# Patient Record
Sex: MALE | Race: WHITE | HISPANIC OR LATINO | Employment: FULL TIME | URBAN - METROPOLITAN AREA
[De-identification: names, ages, dates, MRNs, and addresses within clinical notes are randomized per-mention and may not be internally consistent; named-entity substitution may affect disease eponyms.]

---

## 2024-04-11 ENCOUNTER — OFFICE VISIT (OUTPATIENT)
Dept: FAMILY MEDICINE CLINIC | Facility: CLINIC | Age: 39
End: 2024-04-11

## 2024-04-11 VITALS
SYSTOLIC BLOOD PRESSURE: 128 MMHG | RESPIRATION RATE: 16 BRPM | TEMPERATURE: 98.4 F | BODY MASS INDEX: 28.16 KG/M2 | HEIGHT: 65 IN | HEART RATE: 96 BPM | DIASTOLIC BLOOD PRESSURE: 82 MMHG | WEIGHT: 169 LBS | OXYGEN SATURATION: 99 %

## 2024-04-11 DIAGNOSIS — E03.9 HYPOTHYROIDISM, UNSPECIFIED TYPE: ICD-10-CM

## 2024-04-11 DIAGNOSIS — Z91.018 FOOD ALLERGY: Primary | ICD-10-CM

## 2024-04-11 DIAGNOSIS — Z13.220 ENCOUNTER FOR SCREENING FOR LIPID DISORDER: ICD-10-CM

## 2024-04-11 PROCEDURE — 99203 OFFICE O/P NEW LOW 30 MIN: CPT | Performed by: FAMILY MEDICINE

## 2024-04-11 RX ORDER — EPINEPHRINE 0.15 MG/.3ML
0.15 INJECTION INTRAMUSCULAR ONCE
COMMUNITY
End: 2024-04-11 | Stop reason: SDUPTHER

## 2024-04-11 RX ORDER — LEVOTHYROXINE SODIUM 0.1 MG/1
100 TABLET ORAL DAILY
Qty: 30 TABLET | Refills: 1 | Status: SHIPPED | OUTPATIENT
Start: 2024-04-11

## 2024-04-11 RX ORDER — EPINEPHRINE 0.15 MG/.3ML
0.15 INJECTION INTRAMUSCULAR ONCE
Qty: 0.3 ML | Refills: 0 | Status: SHIPPED | OUTPATIENT
Start: 2024-04-11 | End: 2024-04-11

## 2024-04-11 RX ORDER — LEVOTHYROXINE SODIUM 0.1 MG/1
100 TABLET ORAL DAILY
COMMUNITY
End: 2024-04-11 | Stop reason: SDUPTHER

## 2024-04-11 NOTE — PROGRESS NOTES
Depression Screening and Follow-up Plan: Patient was screened for depression during today's encounter. They screened negative with a PHQ-2 score of 0.    Tobacco Cessation Counseling: Tobacco cessation counseling was provided. The patient is sincerely urged to quit consumption of tobacco. He is ready to quit tobacco.       Assessment/Plan:     Chronic Problems:  No problem-specific Assessment & Plan notes found for this encounter.      Visit Diagnosis:  Diagnoses and all orders for this visit:    Food allergy  Comments:  EpiPen refilled, continued avoidance of known allergens  Orders:  -     EPINEPHrine (EPIPEN JR) 0.15 mg/0.3 mL SOAJ; Inject 0.3 mL (0.15 mg total) into a muscle once for 1 dose    Encounter for screening for lipid disorder  -     Lipid Panel with Direct LDL reflex; Future    Hypothyroidism, unspecified type  Comments:  Will obtain updated TSH along with other age-appropriate screening labs continue present dosing pending results  Orders:  -     Comprehensive metabolic panel; Future  -     CBC and differential; Future  -     TSH, 3rd generation; Future  -     UA w Reflex to Microscopic w Reflex to Culture; Future  -     levothyroxine 100 mcg tablet; Take 1 tablet (100 mcg total) by mouth daily    Other orders  -     Discontinue: levothyroxine 100 mcg tablet; Take 100 mcg by mouth daily  -     Discontinue: EPINEPHrine (EPIPEN JR) 0.15 mg/0.3 mL SOAJ; Inject 0.15 mg into a muscle once          Subjective:    Patient ID: Pedro Morse is a 38 y.o. male.    Negative establish   Last provider many yrs     Concerns:  Will need updated tsh , also reg age labs   No specific issues / no complaints   Doing quite well       Social history  Single   Neg children   Adrian jamison x 1 yr   - smoke   Etoh + social   Drug-     Family history  Father 81  a/w cad , htn chol   Mother 71  a/w   Sister a/w   Sister gluten       PMH:   Past Medical History:   . Food allergy   · Anxiety previously sertraline  ·  Depression   · Hypothyroidism medications Synthroid last TSH?    PSH:     · ANKLE ARTHROSCOPY Left 12/14/2017   ARTHROSCOPY ANKLE performed by Kirk Hassan MD at [facility] OR C1   · ANKLE SURGERY Left 2009   · BICEPS TENODESIS Right 12/15/2021   TENODESIS BICEPS OPEN PROXIMAL performed by Samuel Samuel MD at [facility] [address]   · COLONOSCOPY   · FOOT SURGERY Right 9/19/2019   EXCISION BONE FOOT performed by Kirk Hassan MD at [facility] OR C2   · HERNIA REPAIR Right   Inguinal hernia   · SEPTOPLASTY Bilateral 6/8/2017   SMR W/TURBINOPLASTY performed by Fracisco Fierro MD at [facility] OR [address]TH [address]LO[address]   · SHOULDER ARTHROSCOPY W/ ROTATOR CUFF REPAIR Right 12/15/2021   REPAIR SHOULDER ROTATOR CUFF ARTHROSCOPIC performed by Samuel Samuel MD at [facility] [address]           The following portions of the patient's history were reviewed and updated as appropriate: allergies, current medications, past family history, past medical history, past social history, past surgical history and problem list.    Review of Systems   Constitutional:  Negative for appetite change, chills, fever and unexpected weight change.   HENT:  Negative for congestion, dental problem, ear pain, hearing loss, postnasal drip, rhinorrhea, sinus pressure, sinus pain, sneezing, sore throat, tinnitus and voice change.    Eyes:  Negative for visual disturbance.   Respiratory:  Negative for apnea, cough, chest tightness and shortness of breath.    Cardiovascular:  Negative for chest pain, palpitations and leg swelling.   Gastrointestinal:  Negative for abdominal pain, blood in stool, constipation, diarrhea, nausea and vomiting.   Endocrine: Negative for cold intolerance, heat intolerance, polydipsia, polyphagia and polyuria.   Genitourinary:  Negative for decreased urine volume, difficulty urinating, dysuria, frequency and hematuria.   Musculoskeletal:  Negative for arthralgias, back pain, gait problem, joint swelling and myalgias.   Skin:  Negative for color change, rash  "and wound.   Allergic/Immunologic: Negative for environmental allergies and food allergies.   Neurological:  Negative for dizziness, syncope, weakness, light-headedness, numbness and headaches.   Hematological:  Negative for adenopathy. Does not bruise/bleed easily.   Psychiatric/Behavioral:  Negative for sleep disturbance and suicidal ideas. The patient is not nervous/anxious.          /82 (BP Location: Left arm, Patient Position: Sitting, Cuff Size: Standard)   Pulse 96   Temp 98.4 °F (36.9 °C) (Tympanic)   Resp 16   Ht 5' 5\" (1.651 m)   Wt 76.7 kg (169 lb)   SpO2 99%   BMI 28.12 kg/m²   Social History     Socioeconomic History    Marital status: Single     Spouse name: Not on file    Number of children: Not on file    Years of education: Not on file    Highest education level: Not on file   Occupational History    Not on file   Tobacco Use    Smoking status: Former     Types: Cigarettes    Smokeless tobacco: Never   Substance and Sexual Activity    Alcohol use: Never    Drug use: Never    Sexual activity: Yes   Other Topics Concern    Not on file   Social History Narrative    Not on file     Social Determinants of Health     Financial Resource Strain: Not on file   Food Insecurity: Not on file   Transportation Needs: Not on file   Physical Activity: Not on file   Stress: Not on file   Social Connections: Not on file   Intimate Partner Violence: Not on file   Housing Stability: Not on file     Past Medical History:   Diagnosis Date    Hypothyroid      Family History   Problem Relation Age of Onset    Hyperthyroidism Father      History reviewed. No pertinent surgical history.    Current Outpatient Medications:     EPINEPHrine (EPIPEN JR) 0.15 mg/0.3 mL SOAJ, Inject 0.3 mL (0.15 mg total) into a muscle once for 1 dose, Disp: 0.3 mL, Rfl: 0    levothyroxine 100 mcg tablet, Take 1 tablet (100 mcg total) by mouth daily, Disp: 30 tablet, Rfl: 1    Allergies   Allergen Reactions    Lactose - Food Allergy " "Anaphylaxis    Milk (Cow) Anaphylaxis     Ingredients: milk; Type: Drug;    Type: Food;     Ingredients: milk; Type: Drug;    Type: Food;    Milk-Related Compounds - Food Allergy Anaphylaxis    Nuts - Food Allergy Anaphylaxis    Peanut Oil - Food Allergy Anaphylaxis          Lab Review   No results found for any previous visit.        Imaging: No results found.    Objective:     Physical Exam  Constitutional:       General: He is not in acute distress.     Appearance: Normal appearance. He is well-developed. He is not ill-appearing or toxic-appearing.   HENT:      Head: Normocephalic and atraumatic.      Right Ear: Tympanic membrane normal.      Left Ear: Tympanic membrane normal.   Cardiovascular:      Rate and Rhythm: Normal rate and regular rhythm.      Pulses: Normal pulses.      Heart sounds: Normal heart sounds. No murmur heard.  Pulmonary:      Effort: Pulmonary effort is normal.      Breath sounds: Normal breath sounds.   Abdominal:      General: Bowel sounds are normal.      Palpations: Abdomen is soft.   Musculoskeletal:         General: Normal range of motion.      Cervical back: Normal range of motion and neck supple.   Lymphadenopathy:      Cervical: No cervical adenopathy.   Skin:     General: Skin is warm and dry.      Findings: No lesion or rash.   Neurological:      General: No focal deficit present.      Mental Status: He is alert and oriented to person, place, and time.      Deep Tendon Reflexes: Reflexes are normal and symmetric.   Psychiatric:         Behavior: Behavior normal.         Thought Content: Thought content normal.         Judgment: Judgment normal.           There are no Patient Instructions on file for this visit.    EREN Rodriguez    Portions of the record may have been created with voice recognition software.  Occasional wrong word or \"sound a like\" substitutions may have occurred due to the inherent limitations of voice recognition software.  Read the chart carefully and " recognize, using context, where substitutions have occurred.

## 2024-04-12 ENCOUNTER — APPOINTMENT (OUTPATIENT)
Age: 39
End: 2024-04-12
Payer: COMMERCIAL

## 2024-04-12 DIAGNOSIS — Z13.220 ENCOUNTER FOR SCREENING FOR LIPID DISORDER: ICD-10-CM

## 2024-04-12 DIAGNOSIS — E03.9 HYPOTHYROIDISM, UNSPECIFIED TYPE: ICD-10-CM

## 2024-04-12 LAB
BASOPHILS # BLD AUTO: 0.03 THOUSANDS/ÂΜL (ref 0–0.1)
BASOPHILS NFR BLD AUTO: 1 % (ref 0–1)
BILIRUB UR QL STRIP: NEGATIVE
CLARITY UR: CLEAR
COLOR UR: NORMAL
EOSINOPHIL # BLD AUTO: 0.11 THOUSAND/ÂΜL (ref 0–0.61)
EOSINOPHIL NFR BLD AUTO: 3 % (ref 0–6)
ERYTHROCYTE [DISTWIDTH] IN BLOOD BY AUTOMATED COUNT: 12.5 % (ref 11.6–15.1)
GLUCOSE UR STRIP-MCNC: NEGATIVE MG/DL
HCT VFR BLD AUTO: 45.1 % (ref 36.5–49.3)
HGB BLD-MCNC: 15.1 G/DL (ref 12–17)
HGB UR QL STRIP.AUTO: NEGATIVE
IMM GRANULOCYTES # BLD AUTO: 0.01 THOUSAND/UL (ref 0–0.2)
IMM GRANULOCYTES NFR BLD AUTO: 0 % (ref 0–2)
KETONES UR STRIP-MCNC: NEGATIVE MG/DL
LEUKOCYTE ESTERASE UR QL STRIP: NEGATIVE
LYMPHOCYTES # BLD AUTO: 1.71 THOUSANDS/ÂΜL (ref 0.6–4.47)
LYMPHOCYTES NFR BLD AUTO: 39 % (ref 14–44)
MCH RBC QN AUTO: 30.3 PG (ref 26.8–34.3)
MCHC RBC AUTO-ENTMCNC: 33.5 G/DL (ref 31.4–37.4)
MCV RBC AUTO: 91 FL (ref 82–98)
MONOCYTES # BLD AUTO: 0.42 THOUSAND/ÂΜL (ref 0.17–1.22)
MONOCYTES NFR BLD AUTO: 10 % (ref 4–12)
NEUTROPHILS # BLD AUTO: 2.15 THOUSANDS/ÂΜL (ref 1.85–7.62)
NEUTS SEG NFR BLD AUTO: 47 % (ref 43–75)
NITRITE UR QL STRIP: NEGATIVE
NRBC BLD AUTO-RTO: 0 /100 WBCS
PH UR STRIP.AUTO: 6 [PH]
PLATELET # BLD AUTO: 222 THOUSANDS/UL (ref 149–390)
PMV BLD AUTO: 11.4 FL (ref 8.9–12.7)
PROT UR STRIP-MCNC: NEGATIVE MG/DL
RBC # BLD AUTO: 4.98 MILLION/UL (ref 3.88–5.62)
SP GR UR STRIP.AUTO: 1.02 (ref 1–1.03)
TSH SERPL DL<=0.05 MIU/L-ACNC: 0.92 UIU/ML (ref 0.45–4.5)
UROBILINOGEN UR STRIP-ACNC: <2 MG/DL
WBC # BLD AUTO: 4.43 THOUSAND/UL (ref 4.31–10.16)

## 2024-04-12 PROCEDURE — 81003 URINALYSIS AUTO W/O SCOPE: CPT

## 2024-04-12 PROCEDURE — 36415 COLL VENOUS BLD VENIPUNCTURE: CPT

## 2024-04-12 PROCEDURE — 80053 COMPREHEN METABOLIC PANEL: CPT

## 2024-04-12 PROCEDURE — 84443 ASSAY THYROID STIM HORMONE: CPT

## 2024-04-12 PROCEDURE — 80061 LIPID PANEL: CPT

## 2024-04-12 PROCEDURE — 85025 COMPLETE CBC W/AUTO DIFF WBC: CPT

## 2024-04-13 LAB
ALBUMIN SERPL BCP-MCNC: 4.3 G/DL (ref 3.5–5)
ALP SERPL-CCNC: 42 U/L (ref 34–104)
ALT SERPL W P-5'-P-CCNC: 27 U/L (ref 7–52)
ANION GAP SERPL CALCULATED.3IONS-SCNC: 6 MMOL/L (ref 4–13)
AST SERPL W P-5'-P-CCNC: 23 U/L (ref 13–39)
BILIRUB SERPL-MCNC: 0.54 MG/DL (ref 0.2–1)
BUN SERPL-MCNC: 15 MG/DL (ref 5–25)
CALCIUM SERPL-MCNC: 9.2 MG/DL (ref 8.4–10.2)
CHLORIDE SERPL-SCNC: 104 MMOL/L (ref 96–108)
CHOLEST SERPL-MCNC: 161 MG/DL
CO2 SERPL-SCNC: 30 MMOL/L (ref 21–32)
CREAT SERPL-MCNC: 0.8 MG/DL (ref 0.6–1.3)
GFR SERPL CREATININE-BSD FRML MDRD: 114 ML/MIN/1.73SQ M
GLUCOSE P FAST SERPL-MCNC: 84 MG/DL (ref 65–99)
HDLC SERPL-MCNC: 52 MG/DL
LDLC SERPL CALC-MCNC: 97 MG/DL (ref 0–100)
POTASSIUM SERPL-SCNC: 4.5 MMOL/L (ref 3.5–5.3)
PROT SERPL-MCNC: 6.9 G/DL (ref 6.4–8.4)
SODIUM SERPL-SCNC: 140 MMOL/L (ref 135–147)
TRIGL SERPL-MCNC: 61 MG/DL

## 2024-05-04 DIAGNOSIS — E03.9 HYPOTHYROIDISM, UNSPECIFIED TYPE: ICD-10-CM

## 2024-05-04 RX ORDER — LEVOTHYROXINE SODIUM 0.1 MG/1
100 TABLET ORAL DAILY
Qty: 90 TABLET | Refills: 1 | Status: SHIPPED | OUTPATIENT
Start: 2024-05-04

## 2024-07-30 ENCOUNTER — TELEPHONE (OUTPATIENT)
Dept: PAIN MEDICINE | Facility: CLINIC | Age: 39
End: 2024-07-30
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
## 2024-08-02 ENCOUNTER — CONSULT (OUTPATIENT)
Dept: PAIN MEDICINE | Facility: CLINIC | Age: 39
End: 2024-08-02
Payer: COMMERCIAL

## 2024-08-02 VITALS
HEART RATE: 87 BPM | WEIGHT: 166.4 LBS | DIASTOLIC BLOOD PRESSURE: 81 MMHG | HEIGHT: 65 IN | SYSTOLIC BLOOD PRESSURE: 136 MMHG | BODY MASS INDEX: 27.72 KG/M2

## 2024-08-02 DIAGNOSIS — M54.50 CHRONIC BILATERAL LOW BACK PAIN, UNSPECIFIED WHETHER SCIATICA PRESENT: ICD-10-CM

## 2024-08-02 DIAGNOSIS — G89.29 CHRONIC PAIN OF BOTH SHOULDERS: ICD-10-CM

## 2024-08-02 DIAGNOSIS — M47.812 CERVICAL SPONDYLOSIS: ICD-10-CM

## 2024-08-02 DIAGNOSIS — G89.29 CHRONIC BILATERAL LOW BACK PAIN, UNSPECIFIED WHETHER SCIATICA PRESENT: ICD-10-CM

## 2024-08-02 DIAGNOSIS — M25.512 CHRONIC PAIN OF BOTH SHOULDERS: ICD-10-CM

## 2024-08-02 DIAGNOSIS — Z98.890 HISTORY OF ARTHROSCOPIC SURGERY OF SHOULDER: Primary | ICD-10-CM

## 2024-08-02 DIAGNOSIS — M54.2 NECK PAIN: ICD-10-CM

## 2024-08-02 DIAGNOSIS — M25.511 CHRONIC PAIN OF BOTH SHOULDERS: ICD-10-CM

## 2024-08-02 DIAGNOSIS — M79.18 MYOFASCIAL PAIN SYNDROME: ICD-10-CM

## 2024-08-02 PROCEDURE — 99204 OFFICE O/P NEW MOD 45 MIN: CPT | Performed by: STUDENT IN AN ORGANIZED HEALTH CARE EDUCATION/TRAINING PROGRAM

## 2024-08-02 NOTE — PATIENT INSTRUCTIONS
Patient Education     Neck Pain ED   General Information   You came to the Emergency Department (ED) for neck pain. Your neck has many parts including bones, muscles, tendons, ligaments, and nerves. Vertebrae, the bones in your spine, start at the base of your skull and extend down the back of your neck. There are discs between the vertebrae to cushion the bones. Ligaments, muscles, and tendons help hold your spine in place and let you move your neck. Your spinal cord starts at the base of your brain and extends down your back. It is protected by your vertebrae. Smaller nerves travel from your spinal cord to your muscles and skin.  Most neck pain is caused by an injury to a ligament, tendon, muscle, or nerve. The doctors who examined you today do not think you have a dangerous cause for your neck pain.  What care is needed at home?   Call your regular doctor to let them know you were in the ED. Make a follow-up appointment if you were told to.  Wear your neck brace or cushion as you were told to. If the doctor told you to, you may start doing gentle neck stretches in a few days.  For recent strains, place an ice pack or a bag of frozen vegetables wrapped in a towel over the painful part. Never put ice right on the skin. Use ice every 1 to 2 hours for 10 to 15 minutes at a time. Use for the first 24 to 48 hours after your injury.  Use heat after the first 24 to 48 hours, but not right away. Put a heating pad on the painful part for no more than 20 minutes at a time. Never go to sleep with a heating pad on as this can cause burns. You can also take a hot shower or bath.  You may want to take medicines like ibuprofen or naproxen for swelling and pain. These are nonsteroidal anti-inflammatory drugs (NSAIDs).  Try to practice good posture to avoid putting strain on your neck. Sit up straight and keep your shoulders back. It can also help to avoid sitting in the same position for too long and to avoid putting pressure on  your upper back by carrying heavy things. When you sleep, try to keep your neck in line with the rest of your body.  When do I need to get emergency help?   Call for an ambulance right away if:   Your neck becomes stiff and hard to move and you are feeling sick or develop a fever or chills.  Return to the ED if:   You have new weakness in one of your arms.  When do I need to call the doctor?   You have bad pain that is not helped by pain medicine.  Your hand or arm is swollen.  Your arm is numb or tingly.  You have new or worsening symptoms.  Last Reviewed Date   2021-03-09  Consumer Information Use and Disclaimer   This generalized information is a limited summary of diagnosis, treatment, and/or medication information. It is not meant to be comprehensive and should be used as a tool to help the user understand and/or assess potential diagnostic and treatment options. It does NOT include all information about conditions, treatments, medications, side effects, or risks that may apply to a specific patient. It is not intended to be medical advice or a substitute for the medical advice, diagnosis, or treatment of a health care provider based on the health care provider's examination and assessment of a patient’s specific and unique circumstances. Patients must speak with a health care provider for complete information about their health, medical questions, and treatment options, including any risks or benefits regarding use of medications. This information does not endorse any treatments or medications as safe, effective, or approved for treating a specific patient. [employer] and its affiliates disclaim any warranty or liability relating to this information or the use thereof. The use of this information is governed by the Terms of Use, available at https://www.woltersMiscotauwer.com/en/know/clinical-effectiveness-terms   Copyright   Copyright © 2024 [employer] and its affiliates and/or licensors. All rights  reserved.

## 2024-08-20 ENCOUNTER — EVALUATION (OUTPATIENT)
Dept: PHYSICAL THERAPY | Facility: CLINIC | Age: 39
End: 2024-08-20
Payer: COMMERCIAL

## 2024-08-20 DIAGNOSIS — M25.512 CHRONIC PAIN OF BOTH SHOULDERS: ICD-10-CM

## 2024-08-20 DIAGNOSIS — G89.29 CHRONIC BILATERAL LOW BACK PAIN, UNSPECIFIED WHETHER SCIATICA PRESENT: ICD-10-CM

## 2024-08-20 DIAGNOSIS — M54.2 NECK PAIN: Primary | ICD-10-CM

## 2024-08-20 DIAGNOSIS — M54.50 CHRONIC BILATERAL LOW BACK PAIN, UNSPECIFIED WHETHER SCIATICA PRESENT: ICD-10-CM

## 2024-08-20 DIAGNOSIS — M25.511 CHRONIC PAIN OF BOTH SHOULDERS: ICD-10-CM

## 2024-08-20 DIAGNOSIS — G89.29 CHRONIC PAIN OF BOTH SHOULDERS: ICD-10-CM

## 2024-08-20 DIAGNOSIS — M79.18 MYOFASCIAL PAIN SYNDROME: ICD-10-CM

## 2024-08-20 PROBLEM — M54.6 CHRONIC BILATERAL THORACIC BACK PAIN: Status: ACTIVE | Noted: 2024-08-20

## 2024-08-20 PROCEDURE — 97162 PT EVAL MOD COMPLEX 30 MIN: CPT

## 2024-08-20 PROCEDURE — 97110 THERAPEUTIC EXERCISES: CPT
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
## 2024-08-27 ENCOUNTER — OFFICE VISIT (OUTPATIENT)
Dept: PHYSICAL THERAPY | Facility: CLINIC | Age: 39
End: 2024-08-27
Payer: COMMERCIAL

## 2024-08-27 DIAGNOSIS — M54.2 NECK PAIN: Primary | ICD-10-CM

## 2024-08-27 DIAGNOSIS — M25.512 CHRONIC PAIN OF BOTH SHOULDERS: ICD-10-CM

## 2024-08-27 DIAGNOSIS — G89.29 CHRONIC BILATERAL LOW BACK PAIN, UNSPECIFIED WHETHER SCIATICA PRESENT: ICD-10-CM

## 2024-08-27 DIAGNOSIS — G89.29 CHRONIC PAIN OF BOTH SHOULDERS: ICD-10-CM

## 2024-08-27 DIAGNOSIS — M79.18 MYOFASCIAL PAIN SYNDROME: ICD-10-CM

## 2024-08-27 DIAGNOSIS — M54.50 CHRONIC BILATERAL LOW BACK PAIN, UNSPECIFIED WHETHER SCIATICA PRESENT: ICD-10-CM

## 2024-08-27 DIAGNOSIS — M25.511 CHRONIC PAIN OF BOTH SHOULDERS: ICD-10-CM

## 2024-08-27 PROCEDURE — 97112 NEUROMUSCULAR REEDUCATION: CPT

## 2024-08-27 PROCEDURE — 97140 MANUAL THERAPY 1/> REGIONS: CPT

## 2024-08-27 PROCEDURE — 97110 THERAPEUTIC EXERCISES: CPT

## 2024-08-27 NOTE — PROGRESS NOTES
Daily Note     Today's date: 2024  Patient name: Pedro Morse  : 1985  MRN: [id number]  Referring provider: Everardo Nguyen MD  Dx:   Encounter Diagnosis     ICD-10-CM    1. Neck pain  M54.2       2. Chronic bilateral low back pain, unspecified whether sciatica present  M54.50     G89.29       3. Myofascial pain syndrome  M79.18       4. Chronic pain of both shoulders  M25.511     G89.29     M25.512           Start Time: 1705  Stop Time: 1745  Total time in clinic (min): 40 minutes    Subjective: Pt reports that his neck and R shoulder are the most irritable today. Pt reports that his HEP has been okay and that he has been able to complete all the exercises.       Objective: See treatment diary below      Assessment: Tolerated treatment well. Pt reported minimal relief in the T-spine following manuals, reported that cervical distraction was the best at decreasing overall tension. Repeated thoracic extensions w/ 1/2 foam roll caused more tension in the neck and mid back, DC to later date. Pt reported tension over CTJ, unable to get cavitation or relief through manuals. Pt reported good relief w/ T-spine circuit. Initiated strengthening program through Scap 4, Pt reported feeling challenged. Patient demonstrated fatigue post treatment, exhibited good technique with therapeutic exercises, and would benefit from continued PT for increased mobility, increased strength, and decreased symptom irritability.       Plan: Continue per plan of care.  Progress treatment as tolerated.       Precautions: High symptom irritability     POC expires Unit limit Auth Expiration date PT/OT/ST + Visit Limit?   24 BOMN N/A BOMN                           Visit/Unit Tracking  AUTH Status:  Date              N/A Used 1 2              Remaining                  HEP: [id number]   Manuals            Thoracic G5 JMK JMK           Lumbar G5 JMK            Thoracic G3-4  [id number]           Cervical distraction  KALA    "        CTJ G5  JMK           Neuro Re-Ed             Kieser Row  2x15 30#           Kieser Shoulder ext  2x15 15#           Robberies  2x15 10#                                                               Ther Ex             HEP education 10'            UE bike  3'/3'           Thoracic extensions  2x10 3\" DC           Thoracic circuit  1/2 FR 5'                                                               Ther Activity                                       Gait Training                                       Modalities                                            "

## 2024-08-29 ENCOUNTER — OFFICE VISIT (OUTPATIENT)
Dept: PHYSICAL THERAPY | Facility: CLINIC | Age: 39
End: 2024-08-29
Payer: COMMERCIAL

## 2024-08-29 DIAGNOSIS — M25.511 CHRONIC PAIN OF BOTH SHOULDERS: ICD-10-CM

## 2024-08-29 DIAGNOSIS — M54.2 NECK PAIN: Primary | ICD-10-CM

## 2024-08-29 DIAGNOSIS — M54.50 CHRONIC BILATERAL LOW BACK PAIN, UNSPECIFIED WHETHER SCIATICA PRESENT: ICD-10-CM

## 2024-08-29 DIAGNOSIS — G89.29 CHRONIC PAIN OF BOTH SHOULDERS: ICD-10-CM

## 2024-08-29 DIAGNOSIS — G89.29 CHRONIC BILATERAL LOW BACK PAIN, UNSPECIFIED WHETHER SCIATICA PRESENT: ICD-10-CM

## 2024-08-29 DIAGNOSIS — M25.512 CHRONIC PAIN OF BOTH SHOULDERS: ICD-10-CM

## 2024-08-29 DIAGNOSIS — M79.18 MYOFASCIAL PAIN SYNDROME: ICD-10-CM

## 2024-08-29 PROCEDURE — 97110 THERAPEUTIC EXERCISES: CPT

## 2024-08-29 PROCEDURE — 97112 NEUROMUSCULAR REEDUCATION: CPT

## 2024-08-29 NOTE — PROGRESS NOTES
Daily Note     Today's date: 2024  Patient name: Pedro Morse  : 1985  MRN: [id number]  Referring provider: Everardo Nguyen MD  Dx:   Encounter Diagnosis     ICD-10-CM    1. Neck pain  M54.2       2. Chronic bilateral low back pain, unspecified whether sciatica present  M54.50     G89.29       3. Myofascial pain syndrome  M79.18       4. Chronic pain of both shoulders  M25.511     G89.29     M25.512         Start Time: 1230  Stop Time: 1315  Total time in clinic (min): 45 minutes    Subjective: Pt reports that his neck is very stiff today and that he was not too sore following last session.       Objective: See treatment diary below      Assessment: Tolerated treatment well. Updated Pt's HEP and educated Pt on exercises, added cervical isometrics to further progress Pt' strength. Pt reported less tension in suboccipitals following DNF exercise. Pt had more thoracic mobility compared to previous session. Pt reports Snags increased ROM, but made the tension on the L side of his neck more intense. Monitor Pt's symptom irritability NV and discuss new HEP. Patient demonstrated fatigue post treatment, exhibited good technique with therapeutic exercises, and would benefit from continued PT for increased mobility, increased strength, and decreased symptom irritability.       Plan: Continue per plan of care.  Progress treatment as tolerated.       Precautions: High symptom irritability     POC expires Unit limit Auth Expiration date PT/OT/ST + Visit Limit?   24 BOMN N/A BOMN                           Visit/Unit Tracking  AUTH Status:  Date             N/A Used 1 2 3             Remaining                  HEP: [id number]   Manuals           Thoracic G5 JMK JMK JMK          Lumbar G5 JMK            Thoracic G3-4  JMK JMK          Cervical distraction  JMK           CTJ G5  JMK           Cervical STM   CORTNEYK          Neuro Re-Ed             Kieser Row  2x15 30# 2x15 30#         "  Kieser Shoulder ext  2x15 15# 2x15 15#          Robberies  2x15 10#           DNF    5\" x15                                                 Ther Ex             HEP education 10'            UE bike  3'/3' 3'/3'          Thoracic extensions  2x10 3\" DC           Thoracic circuit  1/2 FR 5' 1/2 FR 5'          Snags   X20 ea                                                 Ther Activity                                       Gait Training                                       Modalities                                            "

## 2024-09-03 ENCOUNTER — OFFICE VISIT (OUTPATIENT)
Dept: PHYSICAL THERAPY | Facility: CLINIC | Age: 39
End: 2024-09-03
Payer: COMMERCIAL

## 2024-09-03 DIAGNOSIS — G89.29 CHRONIC PAIN OF BOTH SHOULDERS: ICD-10-CM

## 2024-09-03 DIAGNOSIS — M79.18 MYOFASCIAL PAIN SYNDROME: ICD-10-CM

## 2024-09-03 DIAGNOSIS — G89.29 CHRONIC BILATERAL LOW BACK PAIN, UNSPECIFIED WHETHER SCIATICA PRESENT: ICD-10-CM

## 2024-09-03 DIAGNOSIS — M25.512 CHRONIC PAIN OF BOTH SHOULDERS: ICD-10-CM

## 2024-09-03 DIAGNOSIS — M25.511 CHRONIC PAIN OF BOTH SHOULDERS: ICD-10-CM

## 2024-09-03 DIAGNOSIS — M54.50 CHRONIC BILATERAL LOW BACK PAIN, UNSPECIFIED WHETHER SCIATICA PRESENT: ICD-10-CM

## 2024-09-03 DIAGNOSIS — M54.2 NECK PAIN: Primary | ICD-10-CM

## 2024-09-03 PROCEDURE — 97140 MANUAL THERAPY 1/> REGIONS: CPT

## 2024-09-03 PROCEDURE — 97112 NEUROMUSCULAR REEDUCATION: CPT

## 2024-09-03 PROCEDURE — 97110 THERAPEUTIC EXERCISES: CPT

## 2024-09-03 NOTE — PROGRESS NOTES
Daily Note     Today's date: 9/3/2024  Patient name: Pedro Morse  : 1985  MRN: [id number]  Referring provider: Everardo Nguyen MD  Dx:   Encounter Diagnosis     ICD-10-CM    1. Neck pain  M54.2       2. Chronic bilateral low back pain, unspecified whether sciatica present  M54.50     G89.29       3. Myofascial pain syndrome  M79.18       4. Chronic pain of both shoulders  M25.511     G89.29     M25.512         Start Time: 1715  Stop Time: 1758  Total time in clinic (min): 43 minutes    Subjective: Pt reports that his mid back is tense today, especially on the L side. Pt reports that his HEP has been helping decrease overall symptom irritability.       Objective: See treatment diary below      Assessment: Tolerated treatment well. Pt was challenged by prone scap retractions, required minimal cueing. Pt was able to increase double hand rows resistance and volume today w/o increase in symptom irritability. Pt reported decrease in symptom irritability following manuals today. Patient demonstrated fatigue post treatment, exhibited good technique with therapeutic exercises, and would benefit from continued PT for increased mobility, increased strength, and decreased symptom irritability.       Plan: Continue per plan of care.  Progress treatment as tolerated.       Precautions: High symptom irritability     POC expires Unit limit Auth Expiration date PT/OT/ST + Visit Limit?   24 BOMN N/A BOMN                           Visit/Unit Tracking  AUTH Status:  Date  9/3           N/A Used 1 2 3 4            Remaining                  HEP: [id number]   Manuals  9/3         Thoracic G5 JMK JMK JMK JMK         Lumbar G5 JMK            Thoracic G3-4  JMK JMK JMK         Cervical distraction  JMK           CTJ G5  JMK           Cervical STM   JMK JMK- Tspine         Neuro Re-Ed             Kieser Row  2x15 30# 2x15 30# 3x15 32#         Kieser Shoulder ext  2x15 15# 2x15 15#          Robberies  " 2x15 10#           DNF    5\" x15 5\" x20         Prone scap retraction    3\" x20                                   Ther Ex             HEP education 10'            UE bike  3'/3' 3'/3' 3'/3'         Thoracic extensions  2x10 3\" DC           Thoracic circuit  1/2 FR 5' 1/2 FR 5' 5'         Snags   X20 ea                                                 Ther Activity                                       Gait Training                                       Modalities                                            "

## 2024-09-10 ENCOUNTER — OFFICE VISIT (OUTPATIENT)
Dept: PHYSICAL THERAPY | Facility: CLINIC | Age: 39
End: 2024-09-10
Payer: COMMERCIAL

## 2024-09-10 DIAGNOSIS — M25.512 CHRONIC PAIN OF BOTH SHOULDERS: ICD-10-CM

## 2024-09-10 DIAGNOSIS — M54.2 NECK PAIN: Primary | ICD-10-CM

## 2024-09-10 DIAGNOSIS — M79.18 MYOFASCIAL PAIN SYNDROME: ICD-10-CM

## 2024-09-10 DIAGNOSIS — M54.50 CHRONIC BILATERAL LOW BACK PAIN, UNSPECIFIED WHETHER SCIATICA PRESENT: ICD-10-CM

## 2024-09-10 DIAGNOSIS — M25.511 CHRONIC PAIN OF BOTH SHOULDERS: ICD-10-CM

## 2024-09-10 DIAGNOSIS — G89.29 CHRONIC PAIN OF BOTH SHOULDERS: ICD-10-CM

## 2024-09-10 DIAGNOSIS — G89.29 CHRONIC BILATERAL LOW BACK PAIN, UNSPECIFIED WHETHER SCIATICA PRESENT: ICD-10-CM

## 2024-09-10 PROCEDURE — 97140 MANUAL THERAPY 1/> REGIONS: CPT

## 2024-09-10 PROCEDURE — 97112 NEUROMUSCULAR REEDUCATION: CPT

## 2024-09-10 PROCEDURE — 97110 THERAPEUTIC EXERCISES: CPT

## 2024-09-10 NOTE — PROGRESS NOTES
Daily Note     Today's date: 9/10/2024  Patient name: Pedro Morse  : 1985  MRN: [id number]  Referring provider: Everardo Nguyen MD  Dx:   Encounter Diagnosis     ICD-10-CM    1. Neck pain  M54.2       2. Chronic bilateral low back pain, unspecified whether sciatica present  M54.50     G89.29       3. Myofascial pain syndrome  M79.18       4. Chronic pain of both shoulders  M25.511     G89.29     M25.512           Start Time: 1712  Stop Time: 1755  Total time in clinic (min): 43 minutes    Subjective: Pt reports that he started experiencing sciatica based symptom down the RLE yesterday and the pain kept him out from work. Pt reports feeling a little better today.        Objective: See treatment diary below      Assessment: Tolerated treatment well. Repeated extensions were unsuccessful in decreasing Pt's symptom irritability and sciatica symptoms. Pt found major relief in long axis distraction, reported relief in the lower extremities and relief in the lumbar spine following. Pt did well w/ exercises, monitor symptoms NV. Patient demonstrated fatigue post treatment, exhibited good technique with therapeutic exercises, and would benefit from continued PT for increased mobility, increased strength, and decreased symptom irritability.       Plan: Continue per plan of care.  Progress treatment as tolerated.       Precautions: High symptom irritability     POC expires Unit limit Auth Expiration date PT/OT/ST + Visit Limit?   24 BOMN N/A BOMN                           Visit/Unit Tracking  AUTH Status:  Date 8/20 8/27 8/29 9/3 9/10          N/A Used 1 2 3 4 5           Remaining                  HEP: [id number]   Manuals 8/20 8/27 8/29 9/3 9/10        Thoracic G5 JMK JMK JMK JMK JMK        Lumbar G5 JMK    JMK        Thoracic G3-4  JMK JMK JMK JMK        Cervical distraction  JMK           CTJ G5  JMK           Cervical STM   CORTNEYK CORTNEYK- Tspine         LAD     CORTNEYK        Neuro Re-Ed             Kieser Row  2x15  "30# 2x15 30# 3x15 32# 3x15 32#        Kieser Shoulder ext  2x15 15# 2x15 15#  2x15 15#        Robberies  2x15 10#           DNF    5\" x15 5\" x20         Prone scap retraction    3\" x20         Repeated extensions     2x30                     Ther Ex             HEP education 10'            UE bike  3'/3' 3'/3' 3'/3' 3'/3'        Thoracic extensions  2x10 3\" DC           Thoracic circuit  1/2 FR 5' 1/2 FR 5' 5' 5'        Snags   X20 ea                                                 Ther Activity                                       Gait Training                                       Modalities                                            "

## 2024-09-12 ENCOUNTER — OFFICE VISIT (OUTPATIENT)
Dept: PHYSICAL THERAPY | Facility: CLINIC | Age: 39
End: 2024-09-12
Payer: COMMERCIAL

## 2024-09-12 DIAGNOSIS — M25.512 CHRONIC PAIN OF BOTH SHOULDERS: ICD-10-CM

## 2024-09-12 DIAGNOSIS — M54.50 CHRONIC BILATERAL LOW BACK PAIN, UNSPECIFIED WHETHER SCIATICA PRESENT: ICD-10-CM

## 2024-09-12 DIAGNOSIS — M79.18 MYOFASCIAL PAIN SYNDROME: ICD-10-CM

## 2024-09-12 DIAGNOSIS — G89.29 CHRONIC BILATERAL LOW BACK PAIN, UNSPECIFIED WHETHER SCIATICA PRESENT: ICD-10-CM

## 2024-09-12 DIAGNOSIS — M25.511 CHRONIC PAIN OF BOTH SHOULDERS: ICD-10-CM

## 2024-09-12 DIAGNOSIS — G89.29 CHRONIC PAIN OF BOTH SHOULDERS: ICD-10-CM

## 2024-09-12 DIAGNOSIS — M54.2 NECK PAIN: Primary | ICD-10-CM

## 2024-09-12 PROCEDURE — 97140 MANUAL THERAPY 1/> REGIONS: CPT

## 2024-09-12 PROCEDURE — 97112 NEUROMUSCULAR REEDUCATION: CPT

## 2024-09-12 PROCEDURE — 97110 THERAPEUTIC EXERCISES: CPT

## 2024-09-12 NOTE — PROGRESS NOTES
Daily Note     Today's date: 2024  Patient name: Pedro Morse  : 1985  MRN: [id number]  Referring provider: Everardo Nguyen MD  Dx:   Encounter Diagnosis     ICD-10-CM    1. Neck pain  M54.2       2. Chronic bilateral low back pain, unspecified whether sciatica present  M54.50     G89.29       3. Myofascial pain syndrome  M79.18       4. Chronic pain of both shoulders  M25.511     G89.29     M25.512         Start Time: 1102  Stop Time: 1145  Total time in clinic (min): 43 minutes    Subjective: Pt reports that he has been having increased soreness in the lumbar spine since last visit. Pt reports that he notices more pain in his low back when sitting down to go to the bathroom.       Objective: See treatment diary below      Assessment: Tolerated treatment well. Pt confirmed that his pain was just with the action of sitting rather than the action of going to the bathroom. Pt was educated on monitoring frequency and control w/ using the bathroom in case symptom presentation would change. LAD and STM utilizing cupping protocol did not change the level of soreness in Pt's lumbar spine. Added thoracic rotation to exercise prescription, Pt reported mild relief. Patient demonstrated fatigue post treatment, exhibited good technique with therapeutic exercises, and would benefit from continued PT for increased mobility, increased strength, and decreased symptom irritability.       Plan: Continue per plan of care.  Progress treatment as tolerated.       Precautions: High symptom irritability     POC expires Unit limit Auth Expiration date PT/OT/ST + Visit Limit?   24 BOMN N/A BOMN                           Visit/Unit Tracking  AUTH Status:  Date 8/20 8/27 8/29 9/3 9/10 9/12         N/A Used 1 2 3 4 5 6          Remaining                  HEP: [id number]   Manuals 8/20 8/27 8/29 9/3 9/10 9/12       Thoracic G5 KALA ARMENDARIZ        Lumbar G5 CORTNEYK    KALA        Thoracic G3-4  KALA ARMENDARIZ      "  Cervical distraction  JMK           CTJ G5  JMK           Cervical STM   JMK JMK- Tspine         LAD     JMK JMK       Cupping protocol      JMK       Neuro Re-Ed             Kieser Row  2x15 30# 2x15 30# 3x15 32# 3x15 32# 3x15 32#       Kieser Shoulder ext  2x15 15# 2x15 15#  2x15 15# 3x12 15#       Robberies  2x15 10#           DNF    5\" x15 5\" x20         Prone scap retraction    3\" x20         Repeated extensions     2x30 x30                    Ther Ex             HEP education 10'            UE bike  3'/3' 3'/3' 3'/3' 3'/3' 3'/3'       Thoracic extensions  2x10 3\" DC           Thoracic circuit  1/2 FR 5' 1/2 FR 5' 5' 5'        Snags   X20 ea          Tspine thread the needle      X15 ea                                 Ther Activity                                       Gait Training                                       Modalities                                            "

## 2024-09-17 ENCOUNTER — OFFICE VISIT (OUTPATIENT)
Dept: PHYSICAL THERAPY | Facility: CLINIC | Age: 39
End: 2024-09-17
Payer: COMMERCIAL

## 2024-09-17 DIAGNOSIS — G89.29 CHRONIC BILATERAL LOW BACK PAIN, UNSPECIFIED WHETHER SCIATICA PRESENT: ICD-10-CM

## 2024-09-17 DIAGNOSIS — M79.18 MYOFASCIAL PAIN SYNDROME: ICD-10-CM

## 2024-09-17 DIAGNOSIS — M25.512 CHRONIC PAIN OF BOTH SHOULDERS: ICD-10-CM

## 2024-09-17 DIAGNOSIS — M54.2 NECK PAIN: Primary | ICD-10-CM

## 2024-09-17 DIAGNOSIS — G89.29 CHRONIC PAIN OF BOTH SHOULDERS: ICD-10-CM

## 2024-09-17 DIAGNOSIS — M54.50 CHRONIC BILATERAL LOW BACK PAIN, UNSPECIFIED WHETHER SCIATICA PRESENT: ICD-10-CM

## 2024-09-17 DIAGNOSIS — M25.511 CHRONIC PAIN OF BOTH SHOULDERS: ICD-10-CM

## 2024-09-17 PROCEDURE — 97110 THERAPEUTIC EXERCISES: CPT

## 2024-09-17 PROCEDURE — 97112 NEUROMUSCULAR REEDUCATION: CPT

## 2024-09-17 PROCEDURE — 97140 MANUAL THERAPY 1/> REGIONS: CPT

## 2024-09-17 NOTE — PROGRESS NOTES
Daily Note     Today's date: 2024  Patient name: Pedro Morse  : 1985  MRN: [id number]  Referring provider: Everardo Nguyen MD  Dx:   Encounter Diagnosis     ICD-10-CM    1. Neck pain  M54.2       2. Chronic bilateral low back pain, unspecified whether sciatica present  M54.50     G89.29       3. Myofascial pain syndrome  M79.18       4. Chronic pain of both shoulders  M25.511     G89.29     M25.512           Start Time: 1716  Stop Time: 1754  Total time in clinic (min): 38 minutes    Subjective: Pt reports that his low back is still the most bothersome and that he has made minimal improvements since last week.       Objective: See treatment diary below      Assessment: Tolerated treatment well. Pt responded well to QL release and cupping protocol today, reported decrease in tension and symptoms. Extensions w/ overpressure were unsuccessful in reducing symptom irritability. Added LE strengthening to program today and reviewed HEP. Patient demonstrated fatigue post treatment, exhibited good technique with therapeutic exercises, and would benefit from continued PT for increased mobility, increased strength, and decreased symptom irritability.       Plan: Continue per plan of care.  Progress treatment as tolerated.       Precautions: High symptom irritability     POC expires Unit limit Auth Expiration date PT/OT/ST + Visit Limit?   24 BOMN N/A BOMN                           Visit/Unit Tracking  AUTH Status:  Date 8/20 8/27 8/29 9/3 9/10 9/12 9/17        N/A Used 1 2 3 4 5 6 7         Remaining                  HEP: [id number]   Manuals 8/20 8/27 8/29 9/3 9/10 9/12 9/17      Thoracic G5 JMK JMK JMK JMK JMK        Lumbar G5 JMK    JMSHAHNAZ        Thoracic G3-4  JMK JMK JMK JMK JMK       Cervical distraction  JMK           CTJ G5  JMK           Cervical STM   JMK JMK- Tspine         LAD     CORTNEYK KALA       Cupping protocol      CORTNEYK KALA      Lumbar gapping and QL release       KALA      Neuro Re-Ed            "  Kieser Row  2x15 30# 2x15 30# 3x15 32# 3x15 32# 3x15 32#       Kieser Shoulder ext  2x15 15# 2x15 15#  2x15 15# 3x12 15#       Robberies  2x15 10#           DNF    5\" x15 5\" x20         Prone scap retraction    3\" x20         Repeated extensions     2x30 x30 X30 w/ overpressure      Side lying hip abduction       2x10 ea      Bird dog       2x10 ea 3\"      Ther Ex             HEP education 10'            UE bike  3'/3' 3'/3' 3'/3' 3'/3' 3'/3' 2'      Thoracic extensions  2x10 3\" DC           Thoracic circuit  1/2 FR 5' 1/2 FR 5' 5' 5'  5'      Snags   X20 ea          Tspine thread the needle      X15 ea       SLR       2x10 ea      Glute bridge       3x10 3\"      Ther Activity                                       Gait Training                                       Modalities                                            "

## 2024-09-17 NOTE — PROGRESS NOTES
Pain Medicine Follow-Up Note    Assessment:  1. Chronic bilateral low back pain with right-sided sciatica    2. Muscle spasm    3. Cervical spondylosis    4. Chronic pain of both shoulders        Plan:  Orders Placed This Encounter   Procedures    MRI lumbar spine wo contrast     Standing Status:   Future     Standing Expiration Date:   9/19/2028     Scheduling Instructions:      There is no preparation for this test. Please leave your jewelry and valuables at home, wedding rings are the exception. All patients will be required to change into a hospital gown and pants.  Street clothes are not permitted in the MRI.  Magnetic nail polish must be removed prior to arrival for your test. Please bring your insurance cards, a form of photo ID and a list of your medications with you. Arrive 15 minutes prior to your appointment time in order to register. Please bring any prior CT or MRI studies of this area that were not performed at a [facility].            To schedule this appointment, please contact Central Scheduling at [phone].            Prior to your appointment, please make sure you complete the MRI Screening Form when you e-Check in for your appointment. This will be available starting 7 days before your appointment in [employer]. You may receive an e-mail with an activation code if you do not have a [employer] account. If you do not have access to a device, we will complete your screening at your appointment.     Order Specific Question:   Reason for Exam     Answer:   low back pain radiates down right leg after doing PT     Order Specific Question:   What is the patient's sedation requirement?     Answer:   No Sedation     Order Specific Question:   Does the patient need medication for Claustrophobia? If yes, order medication at this point.     Answer:   No     Order Specific Question:   Does the patient wear a life vest, have an implanted cardiac device, a stimulation device, a sleep apnea stimulator,  or a breast tissue expansion device?     Answer:   No     Order Specific Question:   Release to patient through [employer]     Answer:   Immediate       New Medications Ordered This Visit   Medications    methylPREDNISolone 4 MG tablet therapy pack     Sig: Use as directed on package     Dispense:  1 each     Refill:  0    methocarbamol (ROBAXIN) 750 mg tablet     Sig: Take 1 tablet (750 mg total) by mouth 3 (three) times a day as needed for muscle spasms (pain)     Dispense:  60 tablet     Refill:  0       My impressions and treatment recommendations were discussed in detail with the patient who verbalized understanding and had no further questions.      Patient returns to the office after participating in physical therapy, patient reports that his neck is improving but is concerned about his bilateral low back which radiates down his right anterior leg.  I recommend that the patient have a MRI of his lumbar spine to further investigate the cause of this pain.    Patient also reporting occasional severe pain, I recommend that he trial a Medrol dose steroid pack, patient reports that he has found methocarbamol 500 mg tablet helpful but it does not resolve his pain.  I will increase methocarbamol to 750 mg tablet patient may take 1 tablet up to 3 times a day as needed for muscle spasms or pain.  Patient understands not to drive or operate any machinery while using this medication.  Verbalized understanding.  Will    Follow-up is planned in after MRI time or sooner as warranted.  Discharge instructions were provided. I personally saw and examined the patient and I agree with the above discussed plan of care.    History of Present Illness:    Pedro Morse is a 39 y.o. male who presents to [facility] for interval re-evaluation of the above stated pain complaints. The patient has a past medical and chronic pain history as outlined in the assessment section. He was last seen on 8/3/2024.    At  today's visit patient states that their pain symptoms are the same with a pain score of 5/10 on the verbal numeric pain scale.  The patient's pain is worse in the morning, evening, and at night.  The patient's pain is constant in nature.  And the quality of the patient's pain is described as dull-aching and sharp.  The patient's pain is located in the posterior neck radiating into the bilateral shoulders down the right upper back as well as bilateral low back wrapping around into his hips and down his right anterior thigh to his knee.    Other than as stated above, the patient denies any interval changes in medications, medical condition, mental condition, symptoms, or allergies since the last office visit.         Review of Systems:    Review of Systems   Respiratory:  Negative for shortness of breath.    Cardiovascular:  Negative for chest pain.   Gastrointestinal:  Negative for constipation, diarrhea, nausea and vomiting.   Musculoskeletal:  Positive for gait problem. Negative for arthralgias, joint swelling and myalgias.        DROM   Skin:  Negative for rash.   Neurological:  Negative for dizziness, seizures and weakness.   All other systems reviewed and are negative.        Past Medical History:   Diagnosis Date    Hypothyroid        History reviewed. No pertinent surgical history.    Family History   Problem Relation Age of Onset    Hyperthyroidism Father        Social History     Occupational History    Not on file   Tobacco Use    Smoking status: Former     Types: Cigarettes    Smokeless tobacco: Never   Substance and Sexual Activity    Alcohol use: Never    Drug use: Never    Sexual activity: Yes         Current Outpatient Medications:     levothyroxine 100 mcg tablet, TAKE 1 TABLET BY MOUTH EVERY DAY, Disp: 90 tablet, Rfl: 1    methocarbamol (ROBAXIN) 750 mg tablet, Take 1 tablet (750 mg total) by mouth 3 (three) times a day as needed for muscle spasms (pain), Disp: 60 tablet, Rfl: 0    methylPREDNISolone  "4 MG tablet therapy pack, Use as directed on package, Disp: 1 each, Rfl: 0    EPINEPHrine (EPIPEN JR) 0.15 mg/0.3 mL SOAJ, Inject 0.3 mL (0.15 mg total) into a muscle once for 1 dose, Disp: 0.3 mL, Rfl: 0    Allergies   Allergen Reactions    Lactose - Food Allergy Anaphylaxis    Milk (Cow) Anaphylaxis     Ingredients: milk; Type: Drug;    Type: Food;     Ingredients: milk; Type: Drug;    Type: Food;    Milk-Related Compounds - Food Allergy Anaphylaxis    Nuts - Food Allergy Anaphylaxis    Peanut Oil - Food Allergy Anaphylaxis       Physical Exam:    /89   Pulse 80   Ht 5' 5\" (1.651 m)   Wt 75.5 kg (166 lb 7.2 oz)   BMI 27.70 kg/m²     Constitutional:normal, well developed, well nourished, alert, in no distress and non-toxic and no overt pain behavior.  Eyes:anicteric  HEENT:grossly intact  Neck:supple, symmetric, trachea midline and no masses   Pulmonary:even and unlabored  Cardiovascular:No edema or pitting edema present  Skin:Normal without rashes or lesions and well hydrated  Psychiatric:Mood and affect appropriate  Neurologic:Cranial Nerves II-XII grossly intact  Musculoskeletal:antalgic gait      Imaging  MRI lumbar spine wo contrast    (Results Pending)         Orders Placed This Encounter   Procedures    MRI lumbar spine wo contrast       This document was created using speech voice recognition software.   Grammatical errors, random word insertions, pronoun errors, and incomplete sentences are an occasional consequence of this system due to software limitations, ambient noise, and hardware issues.   Any formal questions or concerns about content, text, or information contained within the body of this dictation should be directly addressed to the provider for clarification.    "

## 2024-09-19 ENCOUNTER — OFFICE VISIT (OUTPATIENT)
Dept: PAIN MEDICINE | Facility: CLINIC | Age: 39
End: 2024-09-19
Payer: COMMERCIAL

## 2024-09-19 ENCOUNTER — OFFICE VISIT (OUTPATIENT)
Dept: PHYSICAL THERAPY | Facility: CLINIC | Age: 39
End: 2024-09-19
Payer: COMMERCIAL

## 2024-09-19 VITALS
HEIGHT: 65 IN | BODY MASS INDEX: 27.73 KG/M2 | DIASTOLIC BLOOD PRESSURE: 89 MMHG | SYSTOLIC BLOOD PRESSURE: 126 MMHG | WEIGHT: 166.45 LBS | HEART RATE: 80 BPM

## 2024-09-19 DIAGNOSIS — M25.511 CHRONIC PAIN OF BOTH SHOULDERS: ICD-10-CM

## 2024-09-19 DIAGNOSIS — G89.29 CHRONIC BILATERAL LOW BACK PAIN, UNSPECIFIED WHETHER SCIATICA PRESENT: ICD-10-CM

## 2024-09-19 DIAGNOSIS — G89.29 CHRONIC PAIN OF BOTH SHOULDERS: ICD-10-CM

## 2024-09-19 DIAGNOSIS — M25.512 CHRONIC PAIN OF BOTH SHOULDERS: ICD-10-CM

## 2024-09-19 DIAGNOSIS — M47.812 CERVICAL SPONDYLOSIS: ICD-10-CM

## 2024-09-19 DIAGNOSIS — M54.41 CHRONIC BILATERAL LOW BACK PAIN WITH RIGHT-SIDED SCIATICA: Primary | ICD-10-CM

## 2024-09-19 DIAGNOSIS — M54.2 NECK PAIN: Primary | ICD-10-CM

## 2024-09-19 DIAGNOSIS — M79.18 MYOFASCIAL PAIN SYNDROME: ICD-10-CM

## 2024-09-19 DIAGNOSIS — M54.50 CHRONIC BILATERAL LOW BACK PAIN, UNSPECIFIED WHETHER SCIATICA PRESENT: ICD-10-CM

## 2024-09-19 DIAGNOSIS — M62.838 MUSCLE SPASM: ICD-10-CM

## 2024-09-19 DIAGNOSIS — G89.29 CHRONIC BILATERAL LOW BACK PAIN WITH RIGHT-SIDED SCIATICA: Primary | ICD-10-CM

## 2024-09-19 PROCEDURE — 99214 OFFICE O/P EST MOD 30 MIN: CPT

## 2024-09-19 PROCEDURE — 97140 MANUAL THERAPY 1/> REGIONS: CPT

## 2024-09-19 PROCEDURE — 97112 NEUROMUSCULAR REEDUCATION: CPT

## 2024-09-19 PROCEDURE — 97110 THERAPEUTIC EXERCISES: CPT

## 2024-09-19 RX ORDER — METHOCARBAMOL 750 MG/1
750 TABLET, FILM COATED ORAL 3 TIMES DAILY PRN
Qty: 60 TABLET | Refills: 0 | Status: SHIPPED | OUTPATIENT
Start: 2024-09-19

## 2024-09-19 RX ORDER — METHYLPREDNISOLONE 4 MG
TABLET, DOSE PACK ORAL
Qty: 1 EACH | Refills: 0 | Status: SHIPPED | OUTPATIENT
Start: 2024-09-19

## 2024-09-19 NOTE — PROGRESS NOTES
Daily Note     Today's date: 2024  Patient name: Pedro Morse  : 1985  MRN: [id number]  Referring provider: Everardo Nguyen MD  Dx:   Encounter Diagnosis     ICD-10-CM    1. Neck pain  M54.2       2. Chronic bilateral low back pain, unspecified whether sciatica present  M54.50     G89.29       3. Myofascial pain syndrome  M79.18       4. Chronic pain of both shoulders  M25.511     G89.29     M25.512         Start Time: 1155  Stop Time: 1235  Total time in clinic (min): 40 minutes    Subjective: Pt reports that his low back is minimally better compared to last visit. Pt had an appointment w/ pain management and he is getting an MRI of the lumbar spine.        Objective: See treatment diary below      Assessment: Tolerated treatment well. Pt had minimal relief w/ manuals today, reported that lumbar distraction helped decrease the tension in bilateral hips for a shortened period of time. Pt's thoracic mobility continues to improve each session. Patient demonstrated fatigue post treatment, exhibited good technique with therapeutic exercises, and would benefit from continued PT for increased mobility, increased strength, and decreased symptom irritability.       Plan: Continue per plan of care.  Progress treatment as tolerated.       Precautions: High symptom irritability     POC expires Unit limit Auth Expiration date PT/OT/ST + Visit Limit?   24 BOMN N/A BOMN                           Visit/Unit Tracking  AUTH Status:  Date 8/20 8/27 8/29 9/3 9/10 9/12 9/17 9/19       N/A Used 1 2 3 4 5 6 7 8        Remaining                  HEP: [id number]   Manuals 8/20 8/27 8/29 9/3 9/10 9/12 9/17 9/19     Thoracic G5 JMK JMK JMK JMK JMK        Lumbar G5 JMK    JMK        Thoracic G3-4  JMK JMK JMK JMK JMK  JMK     Cervical distraction  JMK           CTJ G5  JMK           Cervical STM   JMK JMK- Tspine         LAD     JMK CORTNEYK  KALA     Cupping protocol      KALA BARRK KALA     Lumbar gapping and QL release       KALA  "KALA     Lumbar manual distraction        KALA     Neuro Re-Ed             Kieser Row  2x15 30# 2x15 30# 3x15 32# 3x15 32# 3x15 32#  3x12 32#     Kieser Shoulder ext  2x15 15# 2x15 15#  2x15 15# 3x12 15#  3x12 15#     Robberies  2x15 10#           DNF    5\" x15 5\" x20         Prone scap retraction    3\" x20         Repeated extensions     2x30 x30 X30 w/ overpressure      Side lying hip abduction       2x10 ea      Bird dog       2x10 ea 3\"      Ther Ex             HEP education 10'            UE bike  3'/3' 3'/3' 3'/3' 3'/3' 3'/3' 2' 3'/3'     Thoracic extensions  2x10 3\" DC           Thoracic circuit  1/2 FR 5' 1/2 FR 5' 5' 5'  5'      Snags   X20 ea          Tspine thread the needle      X15 ea  X15 ea     SLR       2x10 ea      Glute bridge       3x10 3\"      Ther Activity                                       Gait Training                                       Modalities                                            "

## 2024-09-19 NOTE — PATIENT INSTRUCTIONS
Core Strengthening Exercises   WHAT YOU NEED TO KNOW:   What do I need to know about core strengthening exercises?  Core strengthening exercises help heal and strengthen muscles of your hips, back, and abdomen to prevent reinjury. They are beginning exercises to help support your spine. Ask your healthcare provider if you need to see a physical therapist for more advanced exercises.  Do the exercises on a mat or firm surface  (not on a bed) to support your spine and avoid low back pain.     Do the exercises in the same order every time  to train your muscles to work together. Your healthcare provider will show you how to perform these exercises. Do them every day, or as directed by your healthcare provider.     Move slowly and smoothly.  Avoid fast or jerky motions.     Stop if you feel pain.  It is normal to feel some discomfort at first. Regular exercise will help decrease your discomfort over time.  How do I perform core strengthening exercises safely?  Hold each exercise for 5 seconds. When you can do the exercise without pain for 5 seconds, increase your hold to 10 to 15 seconds. When you can do the exercise without pain for 10 to 15 seconds, add the next exercise. Increase the time you hold each exercise, or repeat the exercises as directed. As you do each exercise, breathe normally. Do not hold your breath.  Abdominal bracing:  Lie on your back with your knees bent and feet flat on the floor. Place your arms in a relaxed position beside your body. Pull your belly button in toward your spine. Do not flatten or arch your back. Tighten the abdominal muscles below your belly button. Hold for 5 seconds. Begin all of your exercises with abdominal bracing. You can also practice abdominal bracing throughout the day while you are sitting or standing.           Bridging:  Lie on your back with your knees bent and feet flat on the floor. Rest your arms at your side. Tighten your buttocks, and then lift your hips 1 inch  off the floor. Hold for 5 seconds. When you can do this exercise without pain for 10 seconds, increase the distance you lift your hips. A good goal is to be able to lift your hips so that your shoulders, hips, and knees are in a straight line.           Curl up:  Lie on your back with your knees bent and feet flat on the floor. Place your hands, palms down, underneath the curve in your lower back. Next, with your elbows on the floor, lift your shoulders and chest 2 to 3 inches. Keep your head in line with your shoulders. Hold this position for 5 seconds. When you can do this exercise without pain for 10 to 15 seconds, you may add a rotation. While your shoulders and chest are lifted off the ground, turn slightly to the left and hold. Repeat on the other side.           Dead bug:  Lie on your back with your knees bent and feet flat on the floor. Place your arms in a relaxed position beside your body. Begin with abdominal bracing. Next, raise one leg, keeping your knee bent. Hold for 5 seconds. Repeat with the other leg. When you can do this exercise without pain for 10 to 15 seconds, you may raise one straight leg and hold. Repeat with the other leg.           Quadruped:  Place your hands and knees on the floor. Keep your wrists directly below your shoulders and your knees directly below your hips. Pull your belly button in toward your spine. Do not flatten or arch your back. Tighten your abdominal muscles below your belly button. Hold for 5 seconds. When you can do this exercise without pain for 10 to 15 seconds, you may extend one arm and hold. Repeat on the other side.           Side Bridge:      Standing side bridge:  Stand next to a wall and extend one arm toward the wall. Place your palm flat on the wall with your fingers pointing upward. Begin with abdominal bracing. Next, without moving your feet, slowly bend your arm to 90 degrees. Hold for 5 seconds. Repeat on the other side. When you can do this exercise  without pain for 10 to 15 seconds, you may do the bent leg side bridge on the floor.           Bent leg side bridge:  Lie on one side with your legs, hips, and shoulders in a straight line. Prop yourself up onto your forearm so your elbow is directly below your shoulder. Bend your knees back to 90 degrees. Begin with abdominal bracing. Next, lift your hips and balance yourself on your forearm and knees. Hold for 5 seconds. Repeat on the other side. When you can do this exercise without pain for 10 to 15 seconds, you may do the straight leg side bridge on the floor.           Straight leg side bridge:  Lie on one side with your legs, hips, and shoulders in a straight line. Prop yourself up onto your forearm so your elbow is directly below your shoulder. Begin with abdominal bracing. Lift your hips off the floor and balance yourself on your forearm and the outside of your flexed foot. Do not let your ankle bend sideways. Hold for 5 seconds. Repeat on the other side. When you can do this exercise without pain for 10 to 15 seconds, ask your healthcare provider for more advanced exercises.       When should I contact my healthcare provider?   Your pain becomes worse.    You have new pain.    You have questions or concerns about your condition, care, or exercise program.  CARE AGREEMENT:   You have the right to help plan your care. Learn about your health condition and how it may be treated. Discuss treatment options with your caregivers to decide what care you want to receive. You always have the right to refuse treatment. The above information is an  only. It is not intended as medical advice for individual conditions or treatments. Talk to your doctor, nurse or pharmacist before following any medical regimen to see if it is safe and effective for you.  © 2016 [employer]. Information is for End User's use only and may not be sold, redistributed or otherwise used for commercial purposes.  All illustrations and images included in CareNotes® are the copyrighted property of [email or web address] or [employer].

## 2024-09-24 ENCOUNTER — OFFICE VISIT (OUTPATIENT)
Dept: PHYSICAL THERAPY | Facility: CLINIC | Age: 39
End: 2024-09-24
Payer: COMMERCIAL

## 2024-09-24 DIAGNOSIS — M54.2 NECK PAIN: Primary | ICD-10-CM

## 2024-09-24 DIAGNOSIS — M25.511 CHRONIC PAIN OF BOTH SHOULDERS: ICD-10-CM

## 2024-09-24 DIAGNOSIS — M79.18 MYOFASCIAL PAIN SYNDROME: ICD-10-CM

## 2024-09-24 DIAGNOSIS — M25.512 CHRONIC PAIN OF BOTH SHOULDERS: ICD-10-CM

## 2024-09-24 DIAGNOSIS — M54.50 CHRONIC BILATERAL LOW BACK PAIN, UNSPECIFIED WHETHER SCIATICA PRESENT: ICD-10-CM

## 2024-09-24 DIAGNOSIS — G89.29 CHRONIC BILATERAL LOW BACK PAIN, UNSPECIFIED WHETHER SCIATICA PRESENT: ICD-10-CM

## 2024-09-24 DIAGNOSIS — G89.29 CHRONIC PAIN OF BOTH SHOULDERS: ICD-10-CM

## 2024-09-24 PROCEDURE — 97110 THERAPEUTIC EXERCISES: CPT

## 2024-09-24 PROCEDURE — 97112 NEUROMUSCULAR REEDUCATION: CPT

## 2024-09-24 PROCEDURE — 97140 MANUAL THERAPY 1/> REGIONS: CPT

## 2024-09-24 NOTE — PROGRESS NOTES
Daily Note     Today's date: 2024  Patient name: Pedro Morse  : 1985  MRN: [id number]  Referring provider: Everardo Nguyen MD  Dx:   Encounter Diagnosis     ICD-10-CM    1. Neck pain  M54.2       2. Chronic bilateral low back pain, unspecified whether sciatica present  M54.50     G89.29       3. Myofascial pain syndrome  M79.18       4. Chronic pain of both shoulders  M25.511     G89.29     M25.512           Start Time: 1712  Stop Time: 1752  Total time in clinic (min): 40 minutes    Subjective: Pt reports that his symptom irritability levels are decreased compared to last week. Pt reports that he is still having primarily R sided low back pain w/ radiating down RLE.       Objective: See treatment diary below      Assessment: Tolerated treatment well. Pt had minor relief in lumbar spine through manuals. Pt has heavy R knee varus which may be impacting his lumbar spine and R hip. Focused heavy on strength and stability today. Pt was challenged through leg press, required minor cueing to maintain proper speed. Patient demonstrated fatigue post treatment, exhibited good technique with therapeutic exercises, and would benefit from continued PT for increased mobility, increased strength, and decreased symptom irritability.       Plan: Continue per plan of care.  Progress treatment as tolerated.       Precautions: High symptom irritability     POC expires Unit limit Auth Expiration date PT/OT/ST + Visit Limit?   24 BOMN N/A BOMN                           Visit/Unit Tracking  AUTH Status:  Date 8/20 8/27 8/29 9/3 9/10 9/12 9/17 9/19 9/24      N/A Used 1 2 3 4 5 6 7 8 9       Remaining                  HEP: [id number]   Manuals 8/20 8/27 8/29 9/3 9/10 9/12 9/17 9/19 9/24    Thoracic G5 JMK JMK JMK JMK JMK    JMK    Lumbar G5 JMK    JMK    JMK    Thoracic G3-4  JMK JMK JMK JMK JMK  JMK JMK    Cervical distraction  JMK           CTJ G5  JMK           Cervical STM   JMK JMK- Tspine         LAD     CORTNEYK CORTNEYK   "KALA ARMENDARIZ    Cupping protocol      KALA ARMENDARIZ     Lumbar gapping and QL release       KALA ARMENDARIZ     Lumbar manual distraction        KALA     Neuro Re-Ed             Kieser Row  2x15 30# 2x15 30# 3x15 32# 3x15 32# 3x15 32#  3x12 32# 3x12 36#    Kieser Shoulder ext  2x15 15# 2x15 15#  2x15 15# 3x12 15#  3x12 15# 3x12 15#    Robberies  2x15 10#           DNF    5\" x15 5\" x20         Prone scap retraction    3\" x20         Repeated extensions     2x30 x30 X30 w/ overpressure      Side lying hip abduction       2x10 ea      Bird dog       2x10 ea 3\"  2x10 ea 3\"    Ther Ex             HEP education 10'            UE bike  3'/3' 3'/3' 3'/3' 3'/3' 3'/3' 2' 3'/3' 5' Upright bike    Thoracic extensions  2x10 3\" DC           Thoracic circuit  1/2 FR 5' 1/2 FR 5' 5' 5'  5'      Snags   X20 ea          Tspine thread the needle      X15 ea  X15 ea     SLR       2x10 ea  3x15 ea    Glute bridge       3x10 3\"  3x15 3\"    Leg press         3x12 225#    Ther Activity                                       Gait Training                                       Modalities                                            "

## 2024-09-25 ENCOUNTER — TELEPHONE (OUTPATIENT)
Age: 39
End: 2024-09-25

## 2024-09-25 DIAGNOSIS — Z00.00 WELL ADULT HEALTH CHECK: Primary | ICD-10-CM

## 2024-09-25 NOTE — TELEPHONE ENCOUNTER
Pt called in and shared that his girlfriend tested positive for urea plasma and it was recommended that he reach out to his PCP to get urine testing done to see if this is something he has as well.

## 2024-09-26 ENCOUNTER — OFFICE VISIT (OUTPATIENT)
Dept: PHYSICAL THERAPY | Facility: CLINIC | Age: 39
End: 2024-09-26
Payer: COMMERCIAL

## 2024-09-26 ENCOUNTER — APPOINTMENT (OUTPATIENT)
Dept: LAB | Facility: HOSPITAL | Age: 39
End: 2024-09-26
Payer: COMMERCIAL

## 2024-09-26 DIAGNOSIS — M54.2 NECK PAIN: Primary | ICD-10-CM

## 2024-09-26 DIAGNOSIS — G89.29 CHRONIC PAIN OF BOTH SHOULDERS: ICD-10-CM

## 2024-09-26 DIAGNOSIS — M79.18 MYOFASCIAL PAIN SYNDROME: ICD-10-CM

## 2024-09-26 DIAGNOSIS — M25.512 CHRONIC PAIN OF BOTH SHOULDERS: ICD-10-CM

## 2024-09-26 DIAGNOSIS — G89.29 CHRONIC BILATERAL LOW BACK PAIN, UNSPECIFIED WHETHER SCIATICA PRESENT: ICD-10-CM

## 2024-09-26 DIAGNOSIS — M25.511 CHRONIC PAIN OF BOTH SHOULDERS: ICD-10-CM

## 2024-09-26 DIAGNOSIS — M54.50 CHRONIC BILATERAL LOW BACK PAIN, UNSPECIFIED WHETHER SCIATICA PRESENT: ICD-10-CM

## 2024-09-26 DIAGNOSIS — Z00.00 WELL ADULT HEALTH CHECK: ICD-10-CM

## 2024-09-26 LAB
BILIRUB UR QL STRIP: NEGATIVE
CLARITY UR: CLEAR
COLOR UR: YELLOW
GLUCOSE UR STRIP-MCNC: NEGATIVE MG/DL
HGB UR QL STRIP.AUTO: NEGATIVE
KETONES UR STRIP-MCNC: NEGATIVE MG/DL
LEUKOCYTE ESTERASE UR QL STRIP: NEGATIVE
NITRITE UR QL STRIP: NEGATIVE
PH UR STRIP.AUTO: 5.5 [PH]
PROT UR STRIP-MCNC: NEGATIVE MG/DL
SP GR UR STRIP.AUTO: 1.02 (ref 1–1.03)
UROBILINOGEN UR STRIP-ACNC: <2 MG/DL

## 2024-09-26 PROCEDURE — 97140 MANUAL THERAPY 1/> REGIONS: CPT

## 2024-09-26 PROCEDURE — 97112 NEUROMUSCULAR REEDUCATION: CPT

## 2024-09-26 PROCEDURE — 97110 THERAPEUTIC EXERCISES: CPT

## 2024-09-26 PROCEDURE — 81003 URINALYSIS AUTO W/O SCOPE: CPT

## 2024-09-26 NOTE — PROGRESS NOTES
Daily Note     Today's date: 2024  Patient name: Pedro Morse  : 1985  MRN: [id number]  Referring provider: Everardo Nguyen MD  Dx:   Encounter Diagnosis     ICD-10-CM    1. Neck pain  M54.2       2. Chronic bilateral low back pain, unspecified whether sciatica present  M54.50     G89.29       3. Myofascial pain syndrome  M79.18       4. Chronic pain of both shoulders  M25.511     G89.29     M25.512         Start Time: 1103  Stop Time: 1143  Total time in clinic (min): 40 minutes    Subjective: Pt reports no major changes since last visit. Pt still experiencing global symptom irritability.       Objective: See treatment diary below      Assessment: Tolerated treatment well. Pt was again challenged by exercise prescription. Cupping upper QL on L side reduced heavy tension in lumbar spine. Patient demonstrated fatigue post treatment, exhibited good technique with therapeutic exercises, and would benefit from continued PT for increased mobility, increased strength, and decreased symptom irritability.       Plan: Continue per plan of care.  Progress treatment as tolerated.       Precautions: High symptom irritability     POC expires Unit limit Auth Expiration date PT/OT/ST + Visit Limit?   24 BOMN N/A BOMN                           Visit/Unit Tracking  AUTH Status:  Date 8/20 8/27 8/29 9/3 9/10 9/12 9/17 9/19 9/24 9/26     N/A Used 1 2 3 4 5 6 7 8 9 10      Remaining                  HEP: [id number]   Manuals 8/20 8/27 8/29 9/3 9/10 9/12 9/17 9/19 9/24 9/26   Thoracic G5 JMK JMK JMK JMK JMK    JMK JMK   Lumbar G5 JMK    JMK    JMK    Thoracic G3-4  JMK JMK JMK JMK JMK  JMK JMK JMK   Cervical distraction  JMK           CTJ G5  JMK           Cervical STM   JMK JMK- Tspine         LAD     JMK JMK  JMK JMK    Cupping protocol      JMK JMK JMK  JMK   Lumbar gapping and QL release       JMK JMK  JMK   Lumbar manual distraction        JMK     Neuro Re-Ed             Kieser Row  2x15 30# 2x15 30# 3x15 32#  "3x15 32# 3x15 32#  3x12 32# 3x12 36# 3x12 36#   Kieser Shoulder ext  2x15 15# 2x15 15#  2x15 15# 3x12 15#  3x12 15# 3x12 15# 3x12 18#   Robberies  2x15 10#           DNF    5\" x15 5\" x20         Prone scap retraction    3\" x20         Repeated extensions     2x30 x30 X30 w/ overpressure      Side lying hip abduction       2x10 ea      Bird dog       2x10 ea 3\"  2x10 ea 3\" 3x10 ea 3\"   Ther Ex             HEP education 10'            UE bike  3'/3' 3'/3' 3'/3' 3'/3' 3'/3' 2' 3'/3' 5' Upright bike 5' Upright bike   Thoracic extensions  2x10 3\" DC           Thoracic circuit  1/2 FR 5' 1/2 FR 5' 5' 5'  5'      Snags   X20 ea          Tspine thread the needle      X15 ea  X15 ea     SLR       2x10 ea  3x15 ea 3x15 ea   Glute bridge       3x10 3\"  3x15 3\" 3x15 3\"   Leg press         3x12 225# 3x12 225#   Resisted side step           BLTB and GTB x5   Ther Activity                                       Gait Training                                       Modalities                                            "

## 2024-10-01 ENCOUNTER — OFFICE VISIT (OUTPATIENT)
Dept: PHYSICAL THERAPY | Facility: CLINIC | Age: 39
End: 2024-10-01
Payer: COMMERCIAL

## 2024-10-01 DIAGNOSIS — G89.29 CHRONIC BILATERAL LOW BACK PAIN, UNSPECIFIED WHETHER SCIATICA PRESENT: ICD-10-CM

## 2024-10-01 DIAGNOSIS — M25.512 CHRONIC PAIN OF BOTH SHOULDERS: ICD-10-CM

## 2024-10-01 DIAGNOSIS — M54.50 CHRONIC BILATERAL LOW BACK PAIN, UNSPECIFIED WHETHER SCIATICA PRESENT: ICD-10-CM

## 2024-10-01 DIAGNOSIS — M79.18 MYOFASCIAL PAIN SYNDROME: ICD-10-CM

## 2024-10-01 DIAGNOSIS — M25.511 CHRONIC PAIN OF BOTH SHOULDERS: ICD-10-CM

## 2024-10-01 DIAGNOSIS — G89.29 CHRONIC PAIN OF BOTH SHOULDERS: ICD-10-CM

## 2024-10-01 DIAGNOSIS — M54.2 NECK PAIN: Primary | ICD-10-CM

## 2024-10-01 PROCEDURE — 97112 NEUROMUSCULAR REEDUCATION: CPT

## 2024-10-01 PROCEDURE — 97140 MANUAL THERAPY 1/> REGIONS: CPT

## 2024-10-01 PROCEDURE — 97110 THERAPEUTIC EXERCISES: CPT

## 2024-10-01 NOTE — PROGRESS NOTES
Daily Note     Today's date: 10/1/2024  Patient name: Pedro Morse  : 1985  MRN: [id number]  Referring provider: Everardo Nguyen MD  Dx:   Encounter Diagnosis     ICD-10-CM    1. Neck pain  M54.2       2. Chronic bilateral low back pain, unspecified whether sciatica present  M54.50     G89.29       3. Myofascial pain syndrome  M79.18       4. Chronic pain of both shoulders  M25.511     G89.29     M25.512         Start Time: 1713  Stop Time: 1753  Total time in clinic (min): 40 minutes    Subjective: Pt reports no major changes since last visit. Pt still experiencing heavy tension through the cervical and lumbar spine.       Objective: See treatment diary below      Assessment: Tolerated treatment well. Pt was provided minimal relief w/ manuals today. Pt required curing for new exercises. Pt had more difficulty completing SLR w/ hip ER on the RLE compared to L. Continue to advance Pt's strength NV. Patient demonstrated fatigue post treatment, exhibited good technique with therapeutic exercises, and would benefit from continued PT for increased mobility, increased strength, and decreased symptom irritability.       Plan: Continue per plan of care.  Progress treatment as tolerated.       Precautions: High symptom irritability     POC expires Unit limit Auth Expiration date PT/OT/ST + Visit Limit?   24 BOMN N/A BOMN                           Visit/Unit Tracking  AUTH Status:  Date 8/20 8/27 8/29 9/3 9/10 9/12 9/17 9/19 9/24 9/26 10/1    N/A Used 1 2 3 4 5 6 7 8 9 10 11     Remaining                  HEP: [id number]   Manuals 8/20 8/27 8/29 9/3 9/10 9/12 9/17 9/19 9/24 9/26 10   Thoracic G5 JMK JMK JMK JMK JMK    JMK JMK JMK   Lumbar G5 JMK    JMK    JMK     Thoracic G3-4  JMK JMK JMK JMK JMK  JMK JMK JMK JMK   Cervical distraction  JMK            CTJ G5  JMK            Cervical STM   JMK JMK- Tspine          LAD     JMK JMK  JMK JMK  JMK   Cupping protocol      JMK JMK JMK  JMK    Lumbar gapping and QL  "release       JMK JMK  JMK    Lumbar manual distraction        JMK      Neuro Re-Ed              Kieser Row  2x15 30# 2x15 30# 3x15 32# 3x15 32# 3x15 32#  3x12 32# 3x12 36# 3x12 36# 3x12 38#   Kieser Shoulder ext  2x15 15# 2x15 15#  2x15 15# 3x12 15#  3x12 15# 3x12 15# 3x12 18# 3x12 18#   Robberies  2x15 10#            DNF    5\" x15 5\" x20          Prone scap retraction    3\" x20          Repeated extensions     2x30 x30 X30 w/ overpressure       Side lying hip abduction       2x10 ea       Bird dog       2x10 ea 3\"  2x10 ea 3\" 3x10 ea 3\"    Cervical retraction w/ shoulder shrug           10\" x10   Ther Ex              HEP education 10'             UE bike  3'/3' 3'/3' 3'/3' 3'/3' 3'/3' 2' 3'/3' 5' Upright bike 5' Upright bike 3'/3'   Thoracic extensions  2x10 3\" DC            Thoracic circuit  1/2 FR 5' 1/2 FR 5' 5' 5'  5'       Snags   X20 ea           Tspine thread the needle      X15 ea  X15 ea      SLR       2x10 ea  3x15 ea 3x15 ea 3x10 ea w/ hip ER   Glute bridge       3x10 3\"  3x15 3\" 3x15 3\" 3x15 3\"   Leg press         3x12 225# 3x12 225# 3x12 225#   Resisted side step           BLTB and GTB x5 BLTB and GTB x5   Ther Activity                                          Gait Training                                          Modalities                                               "

## 2024-10-03 ENCOUNTER — APPOINTMENT (OUTPATIENT)
Dept: PHYSICAL THERAPY | Facility: CLINIC | Age: 39
End: 2024-10-03
Payer: COMMERCIAL

## 2024-10-08 ENCOUNTER — OFFICE VISIT (OUTPATIENT)
Dept: PHYSICAL THERAPY | Facility: CLINIC | Age: 39
End: 2024-10-08
Payer: COMMERCIAL

## 2024-10-08 DIAGNOSIS — G89.29 CHRONIC PAIN OF BOTH SHOULDERS: ICD-10-CM

## 2024-10-08 DIAGNOSIS — M25.511 CHRONIC PAIN OF BOTH SHOULDERS: ICD-10-CM

## 2024-10-08 DIAGNOSIS — M54.2 NECK PAIN: Primary | ICD-10-CM

## 2024-10-08 DIAGNOSIS — M25.512 CHRONIC PAIN OF BOTH SHOULDERS: ICD-10-CM

## 2024-10-08 DIAGNOSIS — M54.50 CHRONIC BILATERAL LOW BACK PAIN, UNSPECIFIED WHETHER SCIATICA PRESENT: ICD-10-CM

## 2024-10-08 DIAGNOSIS — G89.29 CHRONIC BILATERAL LOW BACK PAIN, UNSPECIFIED WHETHER SCIATICA PRESENT: ICD-10-CM

## 2024-10-08 DIAGNOSIS — M79.18 MYOFASCIAL PAIN SYNDROME: ICD-10-CM

## 2024-10-08 PROCEDURE — 97110 THERAPEUTIC EXERCISES: CPT

## 2024-10-08 PROCEDURE — 97140 MANUAL THERAPY 1/> REGIONS: CPT

## 2024-10-08 NOTE — PROGRESS NOTES
Daily Note     Today's date: 10/8/2024  Patient name: Pedro Morse  : 1985  MRN: [id number]  Referring provider: Everardo Nguyen MD  Dx:   Encounter Diagnosis     ICD-10-CM    1. Neck pain  M54.2       2. Chronic bilateral low back pain, unspecified whether sciatica present  M54.50     G89.29       3. Myofascial pain syndrome  M79.18       4. Chronic pain of both shoulders  M25.511     G89.29     M25.512           Start Time: 1716  Stop Time: 1810  Total time in clinic (min): 54 minutes    Subjective: Patient reports B lumbar pain as well as pain around B scapular region, which seems to be significantly exacerbated today after coming to PT from work.       Objective: See treatment diary below      Assessment: Tolerated treatment well. Focused primarily on manual therapy today to try and provide patient with relief of initially noted exacerbated sxs, with minimal improvements noted post treatment. Introduced cupping in conjunction with glides to both thoracic and lumbar paraspinals with slight improvements post treatment, but sxs still elevated. Assess patient's response to cupping w/ glides next session. Continue to progress patient as able. Patient demonstrated fatigue post treatment, exhibited good technique with therapeutic exercises, and would benefit from continued PT for increased mobility, increased strength, and decreased symptom irritability.       Plan: Continue per plan of care.  Progress treatment as tolerated.       Precautions: High symptom irritability     POC expires Unit limit Auth Expiration date PT/OT/ST + Visit Limit?   24 BOMN N/A BOMN                           Visit/Unit Tracking  AUTH Status:  Date 8/20 8/27 8/29 9/3 9/10 9/12 9/17 9/19 9/24 9/26 10/1 10/8   N/A Used 1 2 3 4 5 6 7 8 9 10 11 12    Remaining                  HEP: [id number]   Manuals 8/20 8/27 8/29 9/3 9/10 9/12 9/17 9/19 9/24 9/26 10/1 10/8   Thoracic G5 JMK JMK JMK JMK JMK    CORTNEYK CORTNEYK KALA SY   Lumbar G5 CORTNEYK    KALA  "   JMK      Thoracic G3-4  JMK CORTNEYK CORTNEYK CORTNEYK CORTNEYK  CORTNEYK CORTNEYK JMK JMK SY   Cervical distraction  JMK             CTJ G5  JMK             Cervical STM   JMK JMK- Tspine           LAD     JMK CORTNEYK  CORTNEYK CORTENYK  JMK SY   Cupping protocol      CORTNEYK KALA BARRK  KR w/ glides   Lumbar gapping and QL release       KALA ARMENDARIZ  JMK     Lumbar manual distraction        JMK       Neuro Re-Ed               Kieser Row  2x15 30# 2x15 30# 3x15 32# 3x15 32# 3x15 32#  3x12 32# 3x12 36# 3x12 36# 3x12 38# 3x12 38#   Kieser Shoulder ext  2x15 15# 2x15 15#  2x15 15# 3x12 15#  3x12 15# 3x12 15# 3x12 18# 3x12 18# 3x12 18#   Robberies  2x15 10#             DNF    5\" x15 5\" x20           Prone scap retraction    3\" x20           Repeated extensions     2x30 x30 X30 w/ overpressure        Side lying hip abduction       2x10 ea        Bird dog       2x10 ea 3\"  2x10 ea 3\" 3x10 ea 3\"     Cervical retraction w/ shoulder shrug           10\" x10    Ther Ex               HEP education 10'              UE bike  3'/3' 3'/3' 3'/3' 3'/3' 3'/3' 2' 3'/3' 5' Upright bike 5' Upright bike 3'/3' 5' upright bike   Thoracic extensions  2x10 3\" DC             Thoracic circuit  1/2 FR 5' 1/2 FR 5' 5' 5'  5'        Snags   X20 ea            Tspine thread the needle      X15 ea  X15 ea    X15 ea   SLR       2x10 ea  3x15 ea 3x15 ea 3x10 ea w/ hip ER    Glute bridge       3x10 3\"  3x15 3\" 3x15 3\" 3x15 3\"    Leg press         3x12 225# 3x12 225# 3x12 225#    Resisted side step           BLTB and GTB x5 BLTB and GTB x5 BLTB and GTB x5   Ther Activity                                             Gait Training                                             Modalities                                                  "

## 2024-10-10 ENCOUNTER — OFFICE VISIT (OUTPATIENT)
Dept: PHYSICAL THERAPY | Facility: CLINIC | Age: 39
End: 2024-10-10
Payer: COMMERCIAL

## 2024-10-10 DIAGNOSIS — M25.512 CHRONIC PAIN OF BOTH SHOULDERS: ICD-10-CM

## 2024-10-10 DIAGNOSIS — M54.50 CHRONIC BILATERAL LOW BACK PAIN, UNSPECIFIED WHETHER SCIATICA PRESENT: ICD-10-CM

## 2024-10-10 DIAGNOSIS — M54.2 NECK PAIN: Primary | ICD-10-CM

## 2024-10-10 DIAGNOSIS — G89.29 CHRONIC BILATERAL LOW BACK PAIN, UNSPECIFIED WHETHER SCIATICA PRESENT: ICD-10-CM

## 2024-10-10 DIAGNOSIS — M79.18 MYOFASCIAL PAIN SYNDROME: ICD-10-CM

## 2024-10-10 DIAGNOSIS — G89.29 CHRONIC PAIN OF BOTH SHOULDERS: ICD-10-CM

## 2024-10-10 DIAGNOSIS — M25.511 CHRONIC PAIN OF BOTH SHOULDERS: ICD-10-CM

## 2024-10-10 PROCEDURE — 97140 MANUAL THERAPY 1/> REGIONS: CPT

## 2024-10-10 PROCEDURE — 97110 THERAPEUTIC EXERCISES: CPT

## 2024-10-10 NOTE — PROGRESS NOTES
Daily Note     Today's date: 10/10/2024  Patient name: Pedro Morse  : 1985  MRN: [id number]  Referring provider: Everardo Nguyen MD  Dx:   Encounter Diagnosis     ICD-10-CM    1. Neck pain  M54.2       2. Chronic bilateral low back pain, unspecified whether sciatica present  M54.50     G89.29       3. Myofascial pain syndrome  M79.18       4. Chronic pain of both shoulders  M25.511     G89.29     M25.512         Start Time: 1505  Stop Time: 1541  Total time in clinic (min): 36 minutes    Subjective: Patient reports high levels of symptom irritability in mid back and intense tension in lumbar spine       Objective: See treatment diary below      Assessment: Tolerated treatment well. Decreased session time due to high levels of symptom irritability. Manuals decreased majority of lumbar tension, Pt's ROM increased following. Manuals did not improve thoracic/ cervical tension. Added new thoracic rotation exercise to further progress Pt. Pt reported no increase in pain during new exercise. Patient demonstrated fatigue post treatment, exhibited good technique with therapeutic exercises, and would benefit from continued PT for increased mobility, increased strength, and decreased symptom irritability.       Plan: Continue per plan of care.  Progress treatment as tolerated.       Precautions: High symptom irritability     POC expires Unit limit Auth Expiration date PT/OT/ST + Visit Limit?   24 BOMN N/A BOMN                           Visit/Unit Tracking  AUTH Status:  Date 9/10 9/12 9/17 9/19 9/24 9/26 10/1 10/8 10/10   N/A Used 5 6 7 8 9 10 11 12 13    Remaining               HEP: [id number]   Manuals 8/20 8/27 8/29 9/3 9/10 9/12 9/17 9/19 9/24 9/26 10/1 10/8 10/10   Thoracic G5 JMK JMK JMK JMK JMK    JMK JMK JMK SY JMK   Lumbar G5 JMK    JMK    JMK    JMK   Thoracic G3-4  JMK JMK JMK JMK JMK  JMK JMK JMK JMK SY JMK   Cervical distraction  JMK              CTJ G5  JMK              Cervical STM   JMK JMK-  "Tspine            LAD     JMK CORTNEYK  CORTNEYK CORTNEYK  CORTNEYK SY JMK   Cupping protocol      JMK KALA BARRK  JMK  KR w/ glides    Lumbar gapping and QL release       KALA ARMENDARIZ      Lumbar manual distraction        [id number]        Neuro Re-Ed                Kieser Row  2x15 30# 2x15 30# 3x15 32# 3x15 32# 3x15 32#  3x12 32# 3x12 36# 3x12 36# 3x12 38# 3x12 38# 3x12 38#   Kieser Shoulder ext  2x15 15# 2x15 15#  2x15 15# 3x12 15#  3x12 15# 3x12 15# 3x12 18# 3x12 18# 3x12 18# 3x12 20#   Robberies  2x15 10#              DNF    5\" x15 5\" x20            Prone scap retraction    3\" x20            Repeated extensions     2x30 x30 X30 w/ overpressure         Side lying hip abduction       2x10 ea         Bird dog       2x10 ea 3\"  2x10 ea 3\" 3x10 ea 3\"      Cervical retraction w/ shoulder shrug           10\" x10     Ther Ex                HEP education 10'               UE bike  3'/3' 3'/3' 3'/3' 3'/3' 3'/3' 2' 3'/3' 5' Upright bike 5' Upright bike 3'/3' 5' upright bike 5' upright   Thoracic extensions  2x10 3\" DC              Thoracic circuit  1/2 FR 5' 1/2 FR 5' 5' 5'  5'         Snags   X20 ea             Tspine thread the needle      X15 ea  X15 ea    X15 ea    SLR       2x10 ea  3x15 ea 3x15 ea 3x10 ea w/ hip ER     Glute bridge       3x10 3\"  3x15 3\" 3x15 3\" 3x15 3\"     Leg press         3x12 225# 3x12 225# 3x12 225#     Resisted side step           BLTB and GTB x5 BLTB and GTB x5 BLTB and GTB x5    TB resisted thoracic rotation             BLTB x20 ea   Ther Activity                                                Gait Training                                                Modalities                                                     "

## 2024-10-15 ENCOUNTER — APPOINTMENT (OUTPATIENT)
Dept: LAB | Facility: HOSPITAL | Age: 39
End: 2024-10-15
Payer: COMMERCIAL

## 2024-10-15 ENCOUNTER — OFFICE VISIT (OUTPATIENT)
Dept: PHYSICAL THERAPY | Facility: CLINIC | Age: 39
End: 2024-10-15
Payer: COMMERCIAL

## 2024-10-15 DIAGNOSIS — M79.18 MYOFASCIAL PAIN SYNDROME: ICD-10-CM

## 2024-10-15 DIAGNOSIS — G89.29 CHRONIC BILATERAL LOW BACK PAIN, UNSPECIFIED WHETHER SCIATICA PRESENT: ICD-10-CM

## 2024-10-15 DIAGNOSIS — M54.50 CHRONIC BILATERAL LOW BACK PAIN, UNSPECIFIED WHETHER SCIATICA PRESENT: ICD-10-CM

## 2024-10-15 DIAGNOSIS — M54.2 NECK PAIN: Primary | ICD-10-CM

## 2024-10-15 DIAGNOSIS — M25.512 CHRONIC PAIN OF BOTH SHOULDERS: ICD-10-CM

## 2024-10-15 DIAGNOSIS — G89.29 CHRONIC PAIN OF BOTH SHOULDERS: ICD-10-CM

## 2024-10-15 DIAGNOSIS — Z20.2 POSSIBLE EXPOSURE TO STI: ICD-10-CM

## 2024-10-15 DIAGNOSIS — M25.511 CHRONIC PAIN OF BOTH SHOULDERS: ICD-10-CM

## 2024-10-15 PROCEDURE — 97112 NEUROMUSCULAR REEDUCATION: CPT

## 2024-10-15 PROCEDURE — 87109 MYCOPLASMA: CPT

## 2024-10-15 PROCEDURE — 97110 THERAPEUTIC EXERCISES: CPT

## 2024-10-15 PROCEDURE — 97140 MANUAL THERAPY 1/> REGIONS: CPT

## 2024-10-15 NOTE — PROGRESS NOTES
Daily Note     Today's date: 10/15/2024  Patient name: Pedro Morse  : 1985  MRN: [id number]  Referring provider: Everardo Nguyen MD  Dx:   Encounter Diagnosis     ICD-10-CM    1. Neck pain  M54.2       2. Chronic bilateral low back pain, unspecified whether sciatica present  M54.50     G89.29       3. Myofascial pain syndrome  M79.18       4. Chronic pain of both shoulders  M25.511     G89.29     M25.512         Start Time: 1700  Stop Time: 1745  Total time in clinic (min): 45 minutes    Subjective: Patient reports that his L side lumbar is very painful today.       Objective: See treatment diary below      Assessment: Tolerated treatment well. Was able to decrease overall symptom irritability following manuals paired with active glides and extensions. Educated Pt on sciatic glides for home paired w/ lumbar extensions. Pt was challenged by strengthening exercises. Reported decreased overall symptom irritability at conclusion of session. Patient demonstrated fatigue post treatment, exhibited good technique with therapeutic exercises, and would benefit from continued PT for increased mobility, increased strength, and decreased symptom irritability.       Plan: Continue per plan of care.  Progress treatment as tolerated.       Precautions: High symptom irritability     POC expires Unit limit Auth Expiration date PT/OT/ST + Visit Limit?   24 BOMN N/A BOMN                           Visit/Unit Tracking  AUTH Status:  Date 9/10 9/12 9/17 9/19 9/24 9/26 10/1 10/8 10/10 10/15   N/A Used 5 6 7 8 9 10 11 12 13 14    Remaining                HEP: [id number]   Manuals 9/17 9/19 9/24 9/26 10/1 10/8 10/10 10/15   Thoracic G5   JMK JMK JMK SY JMK    Lumbar G5   JMK    JMK JMK   Thoracic G3-4  JMK JMK JMK JMK SY JMK    Cervical distraction           CTJ G5           Cervical STM           LAD  JMK JMK  JMK SY CORTNEYK JMK   Cupping protocol KALA ARMENDARIZ  KR w/ glides     Lumbar gapping and QL release KALA ARMENDARIZ  "  Lumbar manual distraction  JMK      JMK   SI JLAD        JMK   Neuro Re-Ed           Kieser Row  3x12 32# 3x12 36# 3x12 36# 3x12 38# 3x12 38# 3x12 38#    Kieser Shoulder ext  3x12 15# 3x12 15# 3x12 18# 3x12 18# 3x12 18# 3x12 20#    Robberies           DNF            Prone scap retraction           Repeated extensions X30 w/ overpressure       x30   Side lying hip abduction 2x10 ea          Bird dog 2x10 ea 3\"  2x10 ea 3\" 3x10 ea 3\"       Cervical retraction w/ shoulder shrug     10\" x10      Supine sciatic glide        2x20 ea   Ther Ex           HEP education           UE bike 2' 3'/3' 5' Upright bike 5' Upright bike 3'/3' 5' upright bike 5' upright 5' upright    Thoracic extensions           Thoracic circuit 5'          Snags           Tspine thread the needle  X15 ea    X15 ea     SLR 2x10 ea  3x15 ea 3x15 ea 3x10 ea w/ hip ER      Glute bridge 3x10 3\"  3x15 3\" 3x15 3\" 3x15 3\"   3x15 3\"   Leg press   3x12 225# 3x12 225# 3x12 225#   3x12 225#   Resisted side step     BLTB and GTB x5 BLTB and GTB x5 BLTB and GTB x5     TB resisted thoracic rotation       BLTB x20 ea    Walking lunge        X4 laps   Ther Activity                                 Gait Training                                 Modalities                                      "

## 2024-10-17 ENCOUNTER — OFFICE VISIT (OUTPATIENT)
Dept: PHYSICAL THERAPY | Facility: CLINIC | Age: 39
End: 2024-10-17
Payer: COMMERCIAL

## 2024-10-17 DIAGNOSIS — M25.511 CHRONIC PAIN OF BOTH SHOULDERS: ICD-10-CM

## 2024-10-17 DIAGNOSIS — M25.512 CHRONIC PAIN OF BOTH SHOULDERS: ICD-10-CM

## 2024-10-17 DIAGNOSIS — M54.2 NECK PAIN: Primary | ICD-10-CM

## 2024-10-17 DIAGNOSIS — G89.29 CHRONIC BILATERAL LOW BACK PAIN, UNSPECIFIED WHETHER SCIATICA PRESENT: ICD-10-CM

## 2024-10-17 DIAGNOSIS — M79.18 MYOFASCIAL PAIN SYNDROME: ICD-10-CM

## 2024-10-17 DIAGNOSIS — G89.29 CHRONIC PAIN OF BOTH SHOULDERS: ICD-10-CM

## 2024-10-17 DIAGNOSIS — M54.50 CHRONIC BILATERAL LOW BACK PAIN, UNSPECIFIED WHETHER SCIATICA PRESENT: ICD-10-CM

## 2024-10-17 PROCEDURE — 97140 MANUAL THERAPY 1/> REGIONS: CPT

## 2024-10-17 PROCEDURE — 97112 NEUROMUSCULAR REEDUCATION: CPT

## 2024-10-17 NOTE — PROGRESS NOTES
Daily Note     Today's date: 10/17/2024  Patient name: Pedro Morse  : 1985  MRN: [id number]  Referring provider: Everardo Nguyen MD  Dx:   Encounter Diagnosis     ICD-10-CM    1. Neck pain  M54.2       2. Chronic bilateral low back pain, unspecified whether sciatica present  M54.50     G89.29       3. Myofascial pain syndrome  M79.18       4. Chronic pain of both shoulders  M25.511     G89.29     M25.512           Start Time: 1150  Stop Time: 1230  Total time in clinic (min): 40 minutes    Subjective: Patient reports that his lumbar spine is feeling much better following last visit and that he would like to ficus       Objective: See treatment diary below      Assessment: Tolerated treatment well. IASTM of bilateral upper trap and suboccipital was successful in decreasing tension in cervical spine. Pt was challenged by parascapular exercises, no increase in symptom irritability. Pt reported major relief in all aspects following therapy. Patient demonstrated fatigue post treatment, exhibited good technique with therapeutic exercises, and would benefit from continued PT for increased mobility, increased strength, and decreased symptom irritability.       Plan: Continue per plan of care.  Progress treatment as tolerated.       Precautions: High symptom irritability     POC expires Unit limit Auth Expiration date PT/OT/ST + Visit Limit?   24 BOMN N/A BOMN                           Visit/Unit Tracking  AUTH Status:  Date 9/10 9/12 9/17 9/19 9/24 9/26 10/1 10/8 10/10 10/15 10/17   N/A Used 5 6 7 8 9 10 11 12 13 14 15    Remaining                 HEP: [id number]   Manuals 9/17 9/19 9/24 9/26 10/1 10/8 10/10 10/15 10/17   Thoracic G5   JMK JMK JMK SY JMK  JMK   Lumbar G5   JMK    JMK JMK    Thoracic G3-4  JMK JMK JMK JMK SY JMK  JMK   Cervical distraction            CTJ G5            Cervical STM         JMK IASTM   LAD  JMK JMK  JMK SY JMK JMK    Cupping protocol JMK JMK  JMK  KR w/ glides      Lumbar  "gapping and QL release KALA ARMENDARIZ    Lumbar manual distraction  KALA ARMENDARIZ    SI JLAD        KALA    Neuro Re-Ed            Kieser Row  3x12 32# 3x12 36# 3x12 36# 3x12 38# 3x12 38# 3x12 38#  3x12 40#   Kieser Shoulder ext  3x12 15# 3x12 15# 3x12 18# 3x12 18# 3x12 18# 3x12 20#     Robberies         3x12 15#   DNF             Prone scap retraction         3\" x20   Repeated extensions X30 w/ overpressure       x30    Side lying hip abduction 2x10 ea           Bird dog 2x10 ea 3\"  2x10 ea 3\" 3x10 ea 3\"        Cervical retraction w/ shoulder shrug     10\" x10       Supine sciatic glide        2x20 ea    Ther Ex            HEP education            UE bike 2' 3'/3' 5' Upright bike 5' Upright bike 3'/3' 5' upright bike 5' upright 5' upright  3'/3'   Thoracic extensions            Thoracic circuit 5'           Snags            Tspine thread the needle  X15 ea    X15 ea   X15 ea   SLR 2x10 ea  3x15 ea 3x15 ea 3x10 ea w/ hip ER       Glute bridge 3x10 3\"  3x15 3\" 3x15 3\" 3x15 3\"   3x15 3\"    Leg press   3x12 225# 3x12 225# 3x12 225#   3x12 225#    Resisted side step     BLTB and GTB x5 BLTB and GTB x5 BLTB and GTB x5      TB resisted thoracic rotation       BLTB x20 ea     Walking lunge        X4 laps    Ther Activity                                    Gait Training                                    Modalities                                         "

## 2024-10-22 ENCOUNTER — OFFICE VISIT (OUTPATIENT)
Dept: PHYSICAL THERAPY | Facility: CLINIC | Age: 39
End: 2024-10-22
Payer: COMMERCIAL

## 2024-10-22 DIAGNOSIS — G89.29 CHRONIC PAIN OF BOTH SHOULDERS: ICD-10-CM

## 2024-10-22 DIAGNOSIS — M54.50 CHRONIC BILATERAL LOW BACK PAIN, UNSPECIFIED WHETHER SCIATICA PRESENT: ICD-10-CM

## 2024-10-22 DIAGNOSIS — G89.29 CHRONIC BILATERAL LOW BACK PAIN, UNSPECIFIED WHETHER SCIATICA PRESENT: ICD-10-CM

## 2024-10-22 DIAGNOSIS — M25.511 CHRONIC PAIN OF BOTH SHOULDERS: ICD-10-CM

## 2024-10-22 DIAGNOSIS — M79.18 MYOFASCIAL PAIN SYNDROME: ICD-10-CM

## 2024-10-22 DIAGNOSIS — M25.512 CHRONIC PAIN OF BOTH SHOULDERS: ICD-10-CM

## 2024-10-22 DIAGNOSIS — M54.2 NECK PAIN: Primary | ICD-10-CM

## 2024-10-22 LAB
M HOMINIS SPEC QL CULT: NEGATIVE
U UREALYTICUM SPEC QL CULT: NEGATIVE

## 2024-10-22 PROCEDURE — 97140 MANUAL THERAPY 1/> REGIONS: CPT

## 2024-10-22 PROCEDURE — 97112 NEUROMUSCULAR REEDUCATION: CPT

## 2024-10-22 PROCEDURE — 97110 THERAPEUTIC EXERCISES: CPT

## 2024-10-22 NOTE — PROGRESS NOTES
Daily Note     Today's date: 10/22/2024  Patient name: Pedro Morse  : 1985  MRN: [id number]  Referring provider: Everardo Nguyen MD  Dx:   Encounter Diagnosis     ICD-10-CM    1. Neck pain  M54.2       2. Chronic bilateral low back pain, unspecified whether sciatica present  M54.50     G89.29       3. Myofascial pain syndrome  M79.18       4. Chronic pain of both shoulders  M25.511     G89.29     M25.512         Start Time: 1712  Stop Time: 1752  Total time in clinic (min): 40 minutes    Subjective: Patient reports his L lumbar is irritable today.       Objective: See treatment diary below      Assessment: Tolerated treatment well. Added two extension/rotation lumbar exercises to further Pt's mobility. Pt reported minor relief w/ exercises. Manuals were not as successful as previous visits in decreasing symptom irritability. Patient demonstrated fatigue post treatment, exhibited good technique with therapeutic exercises, and would benefit from continued PT for increased mobility, increased strength, and decreased symptom irritability.       Plan: Continue per plan of care.  Progress treatment as tolerated.       Precautions: High symptom irritability     POC expires Unit limit Auth Expiration date PT/OT/ST + Visit Limit?   24 BOMN N/A BOMN                           Visit/Unit Tracking  AUTH Status:  Date 9/10 9/12 9/17 9/19 9/24 9/26 10/1 10/8 10/10 10/15 10/17 10/22   N/A Used 5 6 7 8 9 10 11 12 13 14 15 16    Remaining                  HEP: [id number]   Manuals 9/17 9/19 9/24 9/26 10/1 10/8 10/10 10/15 10/17 10/22   Thoracic G5   JMK JMK JMK SY JMK  JMK JMK   Lumbar G5   JMK    JMK JMK  JMK   Thoracic G3-4  JMK JMK JMK JMK SY JMK  JMK JMK   Cervical distraction             CTJ G5             Cervical STM         JMK IASTM    LAD  JMK JMK  JMK SY JMK JMK  JMK   Cupping protocol JMK JMK  JMK  KR w/ glides       Lumbar gapping and QL release JMK JMK  JMK    JMK  JMK   Lumbar manual distraction  JMK      " CORTNEYK     SI JLAD        CORTNEYK     Neuro Re-Ed             Kieser Row  3x12 32# 3x12 36# 3x12 36# 3x12 38# 3x12 38# 3x12 38#  3x12 40#    Kieser Shoulder ext  3x12 15# 3x12 15# 3x12 18# 3x12 18# 3x12 18# 3x12 20#      Robberies         3x12 15#    DNF              Prone scap retraction         3\" x20    Repeated extensions X30 w/ overpressure       x30  X30 3\"   Side lying hip abduction 2x10 ea            Bird dog 2x10 ea 3\"  2x10 ea 3\" 3x10 ea 3\"      2x10 ea   Cervical retraction w/ shoulder shrug     10\" x10        Supine sciatic glide        2x20 ea     Ther Ex             HEP education             UE bike 2' 3'/3' 5' Upright bike 5' Upright bike 3'/3' 5' upright bike 5' upright 5' upright  3'/3' 5' upright    Thoracic extensions             Thoracic circuit 5'            Snags             Tspine thread the needle  X15 ea    X15 ea   X15 ea    SLR 2x10 ea  3x15 ea 3x15 ea 3x10 ea w/ hip ER        Glute bridge 3x10 3\"  3x15 3\" 3x15 3\" 3x15 3\"   3x15 3\"  3x12 3\"   Leg press   3x12 225# 3x12 225# 3x12 225#   3x12 225#     Resisted side step     BLTB and GTB x5 BLTB and GTB x5 BLTB and GTB x5       TB resisted thoracic rotation       BLTB x20 ea      Walking lunge        X4 laps     Scorpions           X15 ea   Piriformis stretch w/ rotation          X15 ea   Ther Activity                                       Gait Training                                       Modalities                                            "

## 2024-10-24 ENCOUNTER — OFFICE VISIT (OUTPATIENT)
Dept: PHYSICAL THERAPY | Facility: CLINIC | Age: 39
End: 2024-10-24
Payer: COMMERCIAL

## 2024-10-24 DIAGNOSIS — M54.2 NECK PAIN: Primary | ICD-10-CM

## 2024-10-24 DIAGNOSIS — M25.511 CHRONIC PAIN OF BOTH SHOULDERS: ICD-10-CM

## 2024-10-24 DIAGNOSIS — G89.29 CHRONIC PAIN OF BOTH SHOULDERS: ICD-10-CM

## 2024-10-24 DIAGNOSIS — M54.50 CHRONIC BILATERAL LOW BACK PAIN, UNSPECIFIED WHETHER SCIATICA PRESENT: ICD-10-CM

## 2024-10-24 DIAGNOSIS — M25.512 CHRONIC PAIN OF BOTH SHOULDERS: ICD-10-CM

## 2024-10-24 DIAGNOSIS — M79.18 MYOFASCIAL PAIN SYNDROME: ICD-10-CM

## 2024-10-24 DIAGNOSIS — G89.29 CHRONIC BILATERAL LOW BACK PAIN, UNSPECIFIED WHETHER SCIATICA PRESENT: ICD-10-CM

## 2024-10-24 PROCEDURE — 97110 THERAPEUTIC EXERCISES: CPT

## 2024-10-24 PROCEDURE — 97140 MANUAL THERAPY 1/> REGIONS: CPT

## 2024-10-24 NOTE — PROGRESS NOTES
Daily Note     Today's date: 10/24/2024  Patient name: Pedro Morse  : 1985  MRN: [id number]  Referring provider: Everardo Nguyen MD  Dx:   Encounter Diagnosis     ICD-10-CM    1. Neck pain  M54.2       2. Chronic bilateral low back pain, unspecified whether sciatica present  M54.50     G89.29       3. Myofascial pain syndrome  M79.18       4. Chronic pain of both shoulders  M25.511     G89.29     M25.512         Start Time: 1102  Stop Time: 1145  Total time in clinic (min): 43 minutes    Subjective: Patient reports global stiffness and minor symptom irritability in L lumbar spine.       Objective: See treatment diary below      Assessment: Tolerated treatment well. Added more lumbar and hip mobility to program, Pt required cueing for each exercise. Following new mobility exercises, Pt reported a decrease in overall tension and symptom irritability in L lumbar spine. Advance strengthening NV and decrease manuals therapy. Patient demonstrated fatigue post treatment, exhibited good technique with therapeutic exercises, and would benefit from continued PT for increased mobility, increased strength, and decreased symptom irritability.       Plan: Continue per plan of care.  Progress treatment as tolerated.       Precautions: High symptom irritability     POC expires Unit limit Auth Expiration date PT/OT/ST + Visit Limit?   24 BOMN N/A BOMN                           Visit/Unit Tracking  AUTH Status:  Date 9/19 9/24 9/26 10/1 10/8 10/10 10/15 10/17 10/22 10/24   N/A Used 8 9 10 11 12 13 14 15 16 17    Remaining                HEP: [id number]   Manuals 9/17 9/19 9/24 9/26 10/1 10/8 10/10 10/15 10/17 10/22 10/24   Thoracic G5   JMK JMK JMK SY JMK  JMK JMK JMK   Lumbar G5   JMK    JMK JMK  JMK    Thoracic G3-4  JMK JMK JMK JMK SY JMK  JMK JMK JMK   Cervical distraction              CTJ G5              Cervical STM         JMK IASTM     LAD  JMK JMK  JMK SY JMK JMK  JMK    Cupping protocol JMK CORTNEYK  CORTNEYK  KR w/  "glides        Lumbar gapping and QL release KALA ARMENDARIZ   Lumbar manual distraction  KALA ARMENDARIZ      SI JLAD        JMK      Neuro Re-Ed              Kieser Row  3x12 32# 3x12 36# 3x12 36# 3x12 38# 3x12 38# 3x12 38#  3x12 40#     Kieser Shoulder ext  3x12 15# 3x12 15# 3x12 18# 3x12 18# 3x12 18# 3x12 20#       Robberies         3x12 15#     DNF               Prone scap retraction         3\" x20     Repeated extensions X30 w/ overpressure       x30  X30 3\" X30 3\" w/ L hip flex   Side lying hip abduction 2x10 ea             Bird dog 2x10 ea 3\"  2x10 ea 3\" 3x10 ea 3\"      2x10 ea    Cervical retraction w/ shoulder shrug     10\" x10         Supine sciatic glide        2x20 ea      Ther Ex              HEP education              UE bike 2' 3'/3' 5' Upright bike 5' Upright bike 3'/3' 5' upright bike 5' upright 5' upright  3'/3' 5' upright  3'/3'   Thoracic extensions              Thoracic circuit 5'             Snags              Tspine thread the needle  X15 ea    X15 ea   X15 ea     SLR 2x10 ea  3x15 ea 3x15 ea 3x10 ea w/ hip ER         Glute bridge 3x10 3\"  3x15 3\" 3x15 3\" 3x15 3\"   3x15 3\"  3x12 3\"    Leg press   3x12 225# 3x12 225# 3x12 225#   3x12 225#   3x12 225#   Resisted side step     BLTB and GTB x5 BLTB and GTB x5 BLTB and GTB x5        TB resisted thoracic rotation       BLTB x20 ea       Walking lunge        X4 laps      Scorpions           X15 ea X15 ea   Piriformis stretch w/ rotation          X15 ea X15 ea   Hip IR in quadriped           X30 3\" ea   Ther Activity                                          Gait Training                                          Modalities                                               "

## 2024-10-29 ENCOUNTER — OFFICE VISIT (OUTPATIENT)
Dept: PHYSICAL THERAPY | Facility: CLINIC | Age: 39
End: 2024-10-29
Payer: COMMERCIAL

## 2024-10-29 DIAGNOSIS — M54.50 CHRONIC BILATERAL LOW BACK PAIN, UNSPECIFIED WHETHER SCIATICA PRESENT: ICD-10-CM

## 2024-10-29 DIAGNOSIS — G89.29 CHRONIC BILATERAL LOW BACK PAIN, UNSPECIFIED WHETHER SCIATICA PRESENT: ICD-10-CM

## 2024-10-29 DIAGNOSIS — G89.29 CHRONIC PAIN OF BOTH SHOULDERS: ICD-10-CM

## 2024-10-29 DIAGNOSIS — M25.512 CHRONIC PAIN OF BOTH SHOULDERS: ICD-10-CM

## 2024-10-29 DIAGNOSIS — M25.511 CHRONIC PAIN OF BOTH SHOULDERS: ICD-10-CM

## 2024-10-29 DIAGNOSIS — M54.2 NECK PAIN: Primary | ICD-10-CM

## 2024-10-29 DIAGNOSIS — M79.18 MYOFASCIAL PAIN SYNDROME: ICD-10-CM

## 2024-10-29 PROCEDURE — 97112 NEUROMUSCULAR REEDUCATION: CPT

## 2024-10-29 PROCEDURE — 97110 THERAPEUTIC EXERCISES: CPT

## 2024-10-29 NOTE — PROGRESS NOTES
Daily Note     Today's date: 10/29/2024  Patient name: Pedro Morse  : 1985  MRN: [id number]  Referring provider: Everardo Nguyen MD  Dx:   Encounter Diagnosis     ICD-10-CM    1. Neck pain  M54.2       2. Chronic bilateral low back pain, unspecified whether sciatica present  M54.50     G89.29       3. Chronic pain of both shoulders  M25.511     G89.29     M25.512       4. Myofascial pain syndrome  M79.18         Start Time: 1715  Stop Time: 1755  Total time in clinic (min): 40 minutes    Subjective: Patient reports his R SI is in a lot of pain and that it is causing a pulling sensation across his low back.       Objective: See treatment diary below      Assessment: Tolerated treatment well. Pt has anterior pelvic tilt while squatting at 75 degrees. Added heel elevated squat and hold to promote better squatting mechanics, following Pt was able to squat to 110 degrees before having anterior pelvic tilt. No changes in SI pain. Added lumbar extension isometric to advance Pt's stability and strength. Patient demonstrated fatigue post treatment, exhibited good technique with therapeutic exercises, and would benefit from continued PT for increased mobility, increased strength, and decreased symptom irritability.       Plan: Continue per plan of care.  Progress treatment as tolerated.       Precautions: High symptom irritability     POC expires Unit limit Auth Expiration date PT/OT/ST + Visit Limit?   24 BOMN N/A BOMN                           Visit/Unit Tracking  AUTH Status:  Date 9/19 9/24 9/26 10/1 10/8 10/10 10/15 10/17 10/22 10/24 10/29   N/A Used 8 9 10 11 12 13 14 15 16 17 18    Remaining                 HEP: [id number]   Manuals 9/17 9/19 9/24 9/26 10/1 10/8 10/10 10/15 10/17 10/22 10/24 10/29   Thoracic G5   JMK JMK JMK SY JMK  JMK JMK JMK    Lumbar G5   JMK    JMK JMK  JMK     Thoracic G3-4  JMK JMK JMK JMK SY JMK  JMK JMK JMK    Cervical distraction               CTJ G5               Cervical STM   "       JMK IASTM      LAD  CORTNEYK KALA ARMENDARIZ SY CORTNEYK CORTNEYK  CORTNEYK     Cupping protocol JMK KALA BARRK  KR w/ glides         Lumbar gapping and QL release KALA BARRK JMK    Lumbar manual distraction  KALA ARMENDARIZ       SI JLAD        JMSHAHNAZ       Neuro Re-Ed               Kieser Row  3x12 32# 3x12 36# 3x12 36# 3x12 38# 3x12 38# 3x12 38#  3x12 40#      Kieser Shoulder ext  3x12 15# 3x12 15# 3x12 18# 3x12 18# 3x12 18# 3x12 20#        Robberies         3x12 15#      DNF                Prone scap retraction         3\" x20      Repeated extensions X30 w/ overpressure       x30  X30 3\" X30 3\" w/ L hip flex X30 3\" w/ L hip flex   Side lying hip abduction 2x10 ea              Bird dog 2x10 ea 3\"  2x10 ea 3\" 3x10 ea 3\"      2x10 ea     Cervical retraction w/ shoulder shrug     10\" x10          Supine sciatic glide        2x20 ea       Lumbar extension off table            30\" x3   Ther Ex               HEP education               UE bike 2' 3'/3' 5' Upright bike 5' Upright bike 3'/3' 5' upright bike 5' upright 5' upright  3'/3' 5' upright  3'/3' 5' upright   Thoracic extensions               Thoracic circuit 5'              Snags               Tspine thread the needle  X15 ea    X15 ea   X15 ea      SLR 2x10 ea  3x15 ea 3x15 ea 3x10 ea w/ hip ER          Glute bridge 3x10 3\"  3x15 3\" 3x15 3\" 3x15 3\"   3x15 3\"  3x12 3\"     Leg press   3x12 225# 3x12 225# 3x12 225#   3x12 225#   3x12 225#    Resisted side step     BLTB and GTB x5 BLTB and GTB x5 BLTB and GTB x5         TB resisted thoracic rotation       BLTB x20 ea        Walking lunge        X4 laps       Scorpions           X15 ea X15 ea X20 ea   Piriformis stretch w/ rotation          X15 ea X15 ea    Hip IR in quadriped           X30 3\" ea    Elevated heel squat            2x10   Deep squat hold            2'   90-90 hip IR             2x10 ea   Ther Activity                                             Gait Training                                             Modalities     "

## 2024-10-31 DIAGNOSIS — E03.9 HYPOTHYROIDISM, UNSPECIFIED TYPE: ICD-10-CM

## 2024-10-31 RX ORDER — LEVOTHYROXINE SODIUM 100 UG/1
100 TABLET ORAL DAILY
Qty: 90 TABLET | Refills: 1 | Status: SHIPPED | OUTPATIENT
Start: 2024-10-31

## 2024-11-05 ENCOUNTER — OFFICE VISIT (OUTPATIENT)
Dept: PHYSICAL THERAPY | Facility: CLINIC | Age: 39
End: 2024-11-05
Payer: COMMERCIAL

## 2024-11-05 DIAGNOSIS — M79.18 MYOFASCIAL PAIN SYNDROME: ICD-10-CM

## 2024-11-05 DIAGNOSIS — G89.29 CHRONIC BILATERAL LOW BACK PAIN, UNSPECIFIED WHETHER SCIATICA PRESENT: ICD-10-CM

## 2024-11-05 DIAGNOSIS — M54.50 CHRONIC BILATERAL LOW BACK PAIN, UNSPECIFIED WHETHER SCIATICA PRESENT: ICD-10-CM

## 2024-11-05 DIAGNOSIS — G89.29 CHRONIC PAIN OF BOTH SHOULDERS: ICD-10-CM

## 2024-11-05 DIAGNOSIS — M25.512 CHRONIC PAIN OF BOTH SHOULDERS: ICD-10-CM

## 2024-11-05 DIAGNOSIS — M54.2 NECK PAIN: Primary | ICD-10-CM

## 2024-11-05 DIAGNOSIS — M25.511 CHRONIC PAIN OF BOTH SHOULDERS: ICD-10-CM

## 2024-11-05 PROCEDURE — 97140 MANUAL THERAPY 1/> REGIONS: CPT

## 2024-11-05 PROCEDURE — 97110 THERAPEUTIC EXERCISES: CPT

## 2024-11-05 PROCEDURE — 97112 NEUROMUSCULAR REEDUCATION: CPT

## 2024-11-05 NOTE — PROGRESS NOTES
Daily Note     Today's date: 2024  Patient name: Pedro Morse  : 1985  MRN: [id number]  Referring provider: Everardo Nguyen MD  Dx:   Encounter Diagnosis     ICD-10-CM    1. Neck pain  M54.2       2. Chronic bilateral low back pain, unspecified whether sciatica present  M54.50     G89.29       3. Chronic pain of both shoulders  M25.511     G89.29     M25.512       4. Myofascial pain syndrome  M79.18         Start Time: 1707  Stop Time: 1747  Total time in clinic (min): 40 minutes    Subjective: Patient reports his mid back is stiff and his L hip feels locked which is causing low back pain.       Objective: See treatment diary below      Assessment: Tolerated treatment well. LAD helped reduce tension in L hip. Thoracic G5 provided instant relief followed by G3-4 for more mobility. Scapular rows helped activate parascapular musculature and Pt reported that his mid back pain reduced to about 4/10 pain. Pt reported heavy tightness around lumbar spine following lumbar strengthening exercises, also reported nothing more than 4/10 pain in lumbar. Patient demonstrated fatigue post treatment, exhibited good technique with therapeutic exercises, and would benefit from continued PT for increased mobility, increased strength, and decreased symptom irritability.       Plan: Continue per plan of care.  Progress treatment as tolerated.       Precautions: High symptom irritability     POC expires Unit limit Auth Expiration date PT/OT/ST + Visit Limit?   24 BOMN N/A BOMN                           Visit/Unit Tracking  AUTH Status:  Date 9/19 9/24 9/26 10/1 10/8 10/10 10/15 10/17 10/22 10/24 10/29 11/5   N/A Used 8 9 10 11 12 13 14 15 16 17 18 19    Remaining                  HEP: [id number]   Manuals 10/10 10/15 10/17 10/22 10/24 10/29 11/5   Thoracic G5 JMK  JMK CORTNEYK CORTNEYK  JMK   Lumbar G5 JMK JMK  JMK      Thoracic G3-4 JMK  JMK JMK JMK  JMK   Cervical distraction          CTJ G5          Cervical STM   JMK IASTM     "   LAD CORTNEYK KALA ARMENDARIZ   Cupping protocol          Lumbar gapping and QL release  KALA ARMENDARIZ     Lumbar manual distraction  JMK        SI LAD  JMSHAHNAZ        Neuro Re-Ed          Kieser Row 3x12 38#  3x12 40#    3x12 40#   Kieser Shoulder ext 3x12 20#      3x12 22#   Robberies   3x12 15#       DNF           Prone scap retraction   3\" x20    3\" x20   Repeated extensions  x30  X30 3\" X30 3\" w/ L hip flex X30 3\" w/ L hip flex    Side lying hip abduction          Bird dog    2x10 ea   2x10 ea   Dead bug       2x10 ea   Cervical retraction w/ shoulder shrug          Supine sciatic glide  2x20 ea        Lumbar extension off table      30\" x3 30\" x5   Ther Ex          HEP education          UE bike 5' upright 5' upright  3'/3' 5' upright  3'/3' 5' upright 3'/3'   Thoracic extensions          Thoracic circuit          Snags          Tspine thread the needle   X15 ea       SLR          Glute bridge  3x15 3\"  3x12 3\"      Leg press  3x12 225#   3x12 225#  3x12 225#   Resisted side step          TB resisted thoracic rotation BLTB x20 ea         Walking lunge  X4 laps        Scorpions     X15 ea X15 ea X20 ea    Piriformis stretch w/ rotation    X15 ea X15 ea     Hip IR in quadriped     X30 3\" ea     Elevated heel squat      2x10    Deep squat hold      2'    90-90 hip IR       2x10 ea    Ther Activity                              Gait Training                              Modalities                                   "

## 2024-11-07 ENCOUNTER — APPOINTMENT (OUTPATIENT)
Dept: PHYSICAL THERAPY | Facility: CLINIC | Age: 39
End: 2024-11-07
Payer: COMMERCIAL

## 2024-11-12 ENCOUNTER — OFFICE VISIT (OUTPATIENT)
Dept: PHYSICAL THERAPY | Facility: CLINIC | Age: 39
End: 2024-11-12
Payer: COMMERCIAL

## 2024-11-12 DIAGNOSIS — M54.2 NECK PAIN: Primary | ICD-10-CM

## 2024-11-12 DIAGNOSIS — G89.29 CHRONIC BILATERAL LOW BACK PAIN, UNSPECIFIED WHETHER SCIATICA PRESENT: ICD-10-CM

## 2024-11-12 DIAGNOSIS — G89.29 CHRONIC PAIN OF BOTH SHOULDERS: ICD-10-CM

## 2024-11-12 DIAGNOSIS — M25.511 CHRONIC PAIN OF BOTH SHOULDERS: ICD-10-CM

## 2024-11-12 DIAGNOSIS — M25.512 CHRONIC PAIN OF BOTH SHOULDERS: ICD-10-CM

## 2024-11-12 DIAGNOSIS — M54.50 CHRONIC BILATERAL LOW BACK PAIN, UNSPECIFIED WHETHER SCIATICA PRESENT: ICD-10-CM

## 2024-11-12 DIAGNOSIS — M79.18 MYOFASCIAL PAIN SYNDROME: ICD-10-CM

## 2024-11-12 PROCEDURE — 97112 NEUROMUSCULAR REEDUCATION: CPT

## 2024-11-12 PROCEDURE — 97110 THERAPEUTIC EXERCISES: CPT

## 2024-11-12 NOTE — PROGRESS NOTES
Daily Note     Today's date: 2024  Patient name: Pedro Morse  : 1985  MRN: [id number]  Referring provider: Everardo Nguyen MD  Dx:   Encounter Diagnosis     ICD-10-CM    1. Neck pain  M54.2       2. Chronic bilateral low back pain, unspecified whether sciatica present  M54.50     G89.29       3. Chronic pain of both shoulders  M25.511     G89.29     M25.512       4. Myofascial pain syndrome  M79.18         Start Time: 1715  Stop Time: 1759  Total time in clinic (min): 44 minutes    Subjective: Patient reports the center of his lumbar is painful and that his neck is more bothersome today.       Objective: See treatment diary below      Assessment: Tolerated treatment well. Pt reported that he has been having a headache all day due to the stiffness sin his neck. Pt provided verbal approval to perform G5 cervical mobilization, Pt reported good relief and demonstrated a ROM increase. Pt reported that it helped to decrease overall headache symptoms. Pt did well w/ progression of lumbar strengthening, increase hold time to 40 seconds NV. Patient demonstrated fatigue post treatment, exhibited good technique with therapeutic exercises, and would benefit from continued PT for increased mobility, increased strength, and decreased symptom irritability.       Plan: Continue per plan of care.  Progress treatment as tolerated.       Precautions: High symptom irritability     POC expires Unit limit Auth Expiration date PT/OT/ST + Visit Limit?   24 BOMN N/A BOMN                           Visit/Unit Tracking  AUTH Status:  Date 10/22 10/24 10/29 11/5 11/12   N/A Used 16 17 18 19 20    Remaining           HEP: [id number]   Manuals 10/10 10/15 10/17 10/22 10/24 10/29 11/5 11/12   Thoracic G5 JMK  CORTNEYK CORTNEYK CORTNEYK  JMSHAHNAZ    Lumbar G5 JMK CORTNEYK  KALA       Thoracic G3-4 JMK  CORTNEYK KALA ARMENDARIZ    Cervical distraction           CTJ G5           Cervical STM   JMK IASTM        LAD KALA ARMENDARIZ   Cupping protocol          "  Lumbar gapping and QL release  JMK  JMK JMK      Lumbar manual distraction  JMK         SI LAD  JMK         Cervical G5 mob        JMK   Neuro Re-Ed           Kieser Row 3x12 38#  3x12 40#    3x12 40#    Kieser Shoulder ext 3x12 20#      3x12 22#    Robberies   3x12 15#        DNF            Prone scap retraction   3\" x20    3\" x20    Repeated extensions  x30  X30 3\" X30 3\" w/ L hip flex X30 3\" w/ L hip flex     Side lying hip abduction        W/ plank 3x10 ea   Bird dog    2x10 ea   2x10 ea 3x10 ea   Dead bug       2x10 ea 3x10 ea   Cervical retraction w/ shoulder shrug           Supine sciatic glide  2x20 ea         Lumbar extension off table      30\" x3 30\" x5 30\" x5   Lumbar extension w/ Prone Y        15\" x3   Ther Ex           HEP education           UE bike 5' upright 5' upright  3'/3' 5' upright  3'/3' 5' upright 3'/3' 5' upright   Thoracic extensions           Thoracic circuit           Snags           Tspine thread the needle   X15 ea        SLR           Glute bridge  3x15 3\"  3x12 3\"    3x10 3\"   Leg press  3x12 225#   3x12 225#  3x12 225# 3x10 245#   Resisted side step           TB resisted thoracic rotation BLTB x20 ea          Walking lunge  X4 laps         Scorpions     X15 ea X15 ea X20 ea     Piriformis stretch w/ rotation    X15 ea X15 ea      Hip IR in quadriped     X30 3\" ea      Elevated heel squat      2x10     Deep squat hold      2'     90-90 hip IR       2x10 ea     Ther Activity                                 Gait Training                                 Modalities                                      "

## 2024-11-14 ENCOUNTER — OFFICE VISIT (OUTPATIENT)
Dept: FAMILY MEDICINE CLINIC | Facility: CLINIC | Age: 39
End: 2024-11-14
Payer: COMMERCIAL

## 2024-11-14 ENCOUNTER — OFFICE VISIT (OUTPATIENT)
Dept: PHYSICAL THERAPY | Facility: CLINIC | Age: 39
End: 2024-11-14
Payer: COMMERCIAL

## 2024-11-14 VITALS
SYSTOLIC BLOOD PRESSURE: 122 MMHG | RESPIRATION RATE: 18 BRPM | HEIGHT: 65 IN | OXYGEN SATURATION: 98 % | WEIGHT: 165.2 LBS | BODY MASS INDEX: 27.52 KG/M2 | DIASTOLIC BLOOD PRESSURE: 78 MMHG | HEART RATE: 72 BPM

## 2024-11-14 DIAGNOSIS — M25.511 CHRONIC PAIN OF BOTH SHOULDERS: ICD-10-CM

## 2024-11-14 DIAGNOSIS — G89.29 CHRONIC BILATERAL LOW BACK PAIN, UNSPECIFIED WHETHER SCIATICA PRESENT: ICD-10-CM

## 2024-11-14 DIAGNOSIS — G89.29 CHRONIC PAIN OF BOTH SHOULDERS: ICD-10-CM

## 2024-11-14 DIAGNOSIS — M79.18 MYOFASCIAL PAIN SYNDROME: ICD-10-CM

## 2024-11-14 DIAGNOSIS — M54.50 CHRONIC BILATERAL LOW BACK PAIN, UNSPECIFIED WHETHER SCIATICA PRESENT: ICD-10-CM

## 2024-11-14 DIAGNOSIS — L42 PITYRIASIS ROSEA: Primary | ICD-10-CM

## 2024-11-14 DIAGNOSIS — M25.512 CHRONIC PAIN OF BOTH SHOULDERS: ICD-10-CM

## 2024-11-14 DIAGNOSIS — M54.2 NECK PAIN: Primary | ICD-10-CM

## 2024-11-14 PROCEDURE — 97110 THERAPEUTIC EXERCISES: CPT

## 2024-11-14 PROCEDURE — 99213 OFFICE O/P EST LOW 20 MIN: CPT | Performed by: FAMILY MEDICINE

## 2024-11-14 PROCEDURE — 97112 NEUROMUSCULAR REEDUCATION: CPT

## 2024-11-14 RX ORDER — TRIAMCINOLONE ACETONIDE 1 MG/G
CREAM TOPICAL 2 TIMES DAILY
Qty: 80 G | Refills: 2 | Status: SHIPPED | OUTPATIENT
Start: 2024-11-14

## 2024-11-14 NOTE — PROGRESS NOTES
Name: Pedro Morse      : 1985      MRN: [id number]  Encounter Provider: EREN Rodriguez  Encounter Date: 2024   Encounter department: [facility] [address]  :  Assessment & Plan  Pityriasis rosea  Discussed and reviewed findings history, self-limited in nature, may use steroidal cream individual areas  Orders:    triamcinolone (KENALOG) 0.1 % cream; Apply topically 2 (two) times a day           History of Present Illness     C/o rash under arm/right chest wall    Noted 2-3 wks   Later developing similar lesions torso  Nontender nonpruritic  Does not recall cold-like symptoms 1 month ago  Using moisturizer   Negative changes in soap laundry detergent deodorant   Negative travel  Negative known ill contact  Negative fever chills loss of weight negative chronic cough negative complaints of sore throat ear complaints negative bowel or bladder changes    Rash  Pertinent negatives include no congestion, cough, diarrhea, fever, rhinorrhea, shortness of breath, sore throat or vomiting. Past treatments include moisturizer. The treatment provided no relief. His past medical history is significant for allergies and eczema. There were no sick contacts.     Review of Systems   Constitutional:  Negative for appetite change, chills, fever and unexpected weight change.   HENT:  Negative for congestion, dental problem, ear pain, hearing loss, postnasal drip, rhinorrhea, sinus pressure, sinus pain, sneezing, sore throat, tinnitus and voice change.    Eyes:  Negative for visual disturbance.   Respiratory:  Negative for apnea, cough, chest tightness and shortness of breath.    Cardiovascular:  Negative for chest pain, palpitations and leg swelling.   Gastrointestinal:  Negative for abdominal pain, blood in stool, constipation, diarrhea, nausea and vomiting.   Endocrine: Negative for cold intolerance, heat intolerance, polydipsia, polyphagia and polyuria.   Genitourinary:   "Negative for decreased urine volume, difficulty urinating, dysuria, frequency and hematuria.   Musculoskeletal:  Negative for arthralgias, back pain, gait problem, joint swelling and myalgias.   Skin:  Positive for rash. Negative for color change and wound.   Allergic/Immunologic: Negative for environmental allergies and food allergies.   Neurological:  Negative for dizziness, syncope, weakness, light-headedness, numbness and headaches.   Hematological:  Negative for adenopathy. Does not bruise/bleed easily.   Psychiatric/Behavioral:  Negative for sleep disturbance and suicidal ideas. The patient is not nervous/anxious.           Objective   /78 (BP Location: Left arm, Patient Position: Sitting)   Pulse 72   Resp 18   Ht 5' 5\" (1.651 m)   Wt 74.9 kg (165 lb 3.2 oz)   SpO2 98%   BMI 27.49 kg/m²      Physical Exam  Constitutional:       General: He is not in acute distress.     Appearance: He is not ill-appearing or toxic-appearing.   HENT:      Head: Normocephalic and atraumatic.      Right Ear: Tympanic membrane normal.      Left Ear: Tympanic membrane normal.      Mouth/Throat:      Mouth: Mucous membranes are moist.      Pharynx: No posterior oropharyngeal erythema.   Eyes:      General: No scleral icterus.  Cardiovascular:      Rate and Rhythm: Normal rate.      Pulses: Normal pulses.   Pulmonary:      Effort: Pulmonary effort is normal.   Abdominal:      General: Abdomen is flat.   Musculoskeletal:      Cervical back: Normal range of motion.   Skin:     Findings: Rash present.      Comments: [place] patch right chest wall, oval salmon-colored central scale  Additional areas similar smaller in size none confluent torso anterior and posterior negative involvement face neck  Nonvesicular nonpruritic         "

## 2024-11-14 NOTE — PROGRESS NOTES
Daily Note     Today's date: 2024  Patient name: Pedro Morse  : 1985  MRN: [id number]  Referring provider: Everardo Nguyen MD  Dx:   Encounter Diagnosis     ICD-10-CM    1. Neck pain  M54.2       2. Chronic bilateral low back pain, unspecified whether sciatica present  M54.50     G89.29       3. Chronic pain of both shoulders  M25.511     G89.29     M25.512       4. Myofascial pain syndrome  M79.18         Start Time: 1148  Stop Time: 1230  Total time in clinic (min): 42 minutes    Subjective: Patient reports that his lumbar was sore following last session, but his cervical spine is feeling much better today.       Objective: See treatment diary below      Assessment: Tolerated treatment well. Continued to progress strengthening for lumbar extensions. Pt still challenged by exercise prescription. Manual therapy helped to reduce tension and increase ROM during exercise program. Patient demonstrated fatigue post treatment, exhibited good technique with therapeutic exercises, and would benefit from continued PT for increased mobility, increased strength, and decreased symptom irritability.       Plan: Continue per plan of care.  Progress treatment as tolerated.       Precautions: High symptom irritability     POC expires Unit limit Auth Expiration date PT/OT/ST + Visit Limit?   24 BOMN N/A BOMN                           Visit/Unit Tracking  AUTH Status:  Date 10/22 10/24 10/29 11/5 11/12 11/14   N/A Used 16 17 18 19 20 21    Remaining            HEP: [id number]   Manuals 10/10 10/15 10/17 10/22 10/24 10/29 11/5 11/12 11/14   Thoracic G5 JMK  JMK JMK JMK  JMK  JMK   Lumbar G5 JMK JMK  JMK     JMK   Thoracic G3-4 JMK  JMK JMK JMK  JMK     Cervical distraction            CTJ G5            Cervical STM   JMK IASTM         LAD JMK JMK  JMK   JMK JMK JMK   Cupping protocol            Lumbar gapping and QL release  JMK  JMK JMK       Lumbar manual distraction  JMK          SI LAD  JMK          Cervical G5  "mob        JMK    Neuro Re-Ed            Kieser Row 3x12 38#  3x12 40#    3x12 40#  3x12 40#   Kieser Shoulder ext 3x12 20#      3x12 22#  3x12 20#   Robberies   3x12 15#         DNF             Prone scap retraction   3\" x20    3\" x20     Repeated extensions  x30  X30 3\" X30 3\" w/ L hip flex X30 3\" w/ L hip flex      Side lying hip abduction        W/ plank 3x10 ea W/ plank 3x10 ea   Bird dog    2x10 ea   2x10 ea 3x10 ea 3x10 ea   Dead bug       2x10 ea 3x10 ea 3x10 ea   Cervical retraction w/ shoulder shrug            Supine sciatic glide  2x20 ea          Lumbar extension off table      30\" x3 30\" x5 30\" x5 40\" x5   Lumbar extension w/ Prone Y        15\" x3 2x10   Ther Ex            HEP education            UE bike 5' upright 5' upright  3'/3' 5' upright  3'/3' 5' upright 3'/3' 5' upright 5' upright   Thoracic extensions            Thoracic circuit            Snags            Tspine thread the needle   X15 ea         SLR            Glute bridge  3x15 3\"  3x12 3\"    3x10 3\" 3x10 3\"   Leg press  3x12 225#   3x12 225#  3x12 225# 3x10 245#    Resisted side step            TB resisted thoracic rotation BLTB x20 ea           Walking lunge  X4 laps          Scorpions     X15 ea X15 ea X20 ea      Piriformis stretch w/ rotation    X15 ea X15 ea       Hip IR in quadriped     X30 3\" ea       Elevated heel squat      2x10      Deep squat hold      2'      90-90 hip IR       2x10 ea      Ther Activity                                    Gait Training                                    Modalities                                         "

## 2024-11-19 ENCOUNTER — OFFICE VISIT (OUTPATIENT)
Dept: PHYSICAL THERAPY | Facility: CLINIC | Age: 39
End: 2024-11-19
Payer: COMMERCIAL

## 2024-11-19 DIAGNOSIS — M79.18 MYOFASCIAL PAIN SYNDROME: ICD-10-CM

## 2024-11-19 DIAGNOSIS — M54.50 CHRONIC BILATERAL LOW BACK PAIN, UNSPECIFIED WHETHER SCIATICA PRESENT: ICD-10-CM

## 2024-11-19 DIAGNOSIS — G89.29 CHRONIC BILATERAL LOW BACK PAIN, UNSPECIFIED WHETHER SCIATICA PRESENT: ICD-10-CM

## 2024-11-19 DIAGNOSIS — M25.511 CHRONIC PAIN OF BOTH SHOULDERS: ICD-10-CM

## 2024-11-19 DIAGNOSIS — G89.29 CHRONIC PAIN OF BOTH SHOULDERS: ICD-10-CM

## 2024-11-19 DIAGNOSIS — M54.2 NECK PAIN: Primary | ICD-10-CM

## 2024-11-19 DIAGNOSIS — M25.512 CHRONIC PAIN OF BOTH SHOULDERS: ICD-10-CM

## 2024-11-19 PROCEDURE — 97112 NEUROMUSCULAR REEDUCATION: CPT

## 2024-11-19 PROCEDURE — 97110 THERAPEUTIC EXERCISES: CPT

## 2024-11-19 PROCEDURE — 97140 MANUAL THERAPY 1/> REGIONS: CPT

## 2024-11-19 NOTE — PROGRESS NOTES
Daily Note     Today's date: 2024  Patient name: Pedro Morse  : 1985  MRN: [id number]  Referring provider: Everardo Nguyen MD  Dx:   Encounter Diagnosis     ICD-10-CM    1. Neck pain  M54.2       2. Chronic bilateral low back pain, unspecified whether sciatica present  M54.50     G89.29       3. Chronic pain of both shoulders  M25.511     G89.29     M25.512       4. Myofascial pain syndrome  M79.18         Start Time: 1716  Stop Time: 1802  Total time in clinic (min): 46 minutes    Subjective: Patient reports that his neck is very bothersome today and his low back is about the same.       Objective: See treatment diary below      Assessment: Tolerated treatment well. Advanced lumbar extension strengthening today, Pt reported no increase in symptom irritability. Performed more manual therapy on thoracic spine and upper trap to help decrease tension in cervical spine. Pt reported minimal relief from manuals today. Continue to progress Pt's posterior chain strengthening and lumbar extensor strength NV. Patient demonstrated fatigue post treatment, exhibited good technique with therapeutic exercises, and would benefit from continued PT for increased mobility, increased strength, and decreased symptom irritability.       Plan: Continue per plan of care.  Progress treatment as tolerated.       Precautions: High symptom irritability     POC expires Unit limit Auth Expiration date PT/OT/ST + Visit Limit?   24 BOMN N/A BOMN                           Visit/Unit Tracking  AUTH Status:  Date 10/22 10/24 10/29 11/5 11/12 11/14 11/19   N/A Used 16 17 18 19 20 21 22    Remaining             HEP: [id number]   Manuals 10/10 10/15 10/17 10/22 10/24 10/29 11/5 11/12 11/14 11/19   Thoracic G5 JMK  JMK JMK JMK  JMK  JMK JMK   Lumbar G5 JMK JMK  JMK     JMK    Thoracic G3-4 JMK  JMK CORTNEYK CORTNEYK  CORTNEYK   KALA   Cervical distraction             CTJ G5             Cervical STM   JMK IASTM          LAD CORTNEYK CORTNEYK  CORTNEYK   KALA ARMENDARIZ  "JMK   Cupping protocol             Lumbar gapping and QL release  JMK  JMK JMK        Lumbar manual distraction  JMK           SI LAD  JMK           Cervical G5 mob        JMK     Upper trap IASTM          JMK   Neuro Re-Ed             Kieser Row 3x12 38#  3x12 40#    3x12 40#  3x12 40#    Kieser Shoulder ext 3x12 20#      3x12 22#  3x12 20#    Robberies   3x12 15#          DNF              Prone scap retraction   3\" x20    3\" x20   3\" x20   Repeated extensions  x30  X30 3\" X30 3\" w/ L hip flex X30 3\" w/ L hip flex       Side lying hip abduction        W/ plank 3x10 ea W/ plank 3x10 ea W/ plank 2x10 ea   Bird dog    2x10 ea   2x10 ea 3x10 ea 3x10 ea 2x10 ea   Dead bug       2x10 ea 3x10 ea 3x10 ea 2x10 ea   Cervical retraction w/ shoulder shrug             Supine sciatic glide  2x20 ea           Lumbar extension off table      30\" x3 30\" x5 30\" x5 40\" x5 1' x4   Lumbar extension w/ Prone Y        15\" x3 2x10    Ther Ex             HEP education             UE bike 5' upright 5' upright  3'/3' 5' upright  3'/3' 5' upright 3'/3' 5' upright 5' upright 5' upright   Thoracic extensions             Thoracic circuit          5'   Snags             Tspine thread the needle   X15 ea          SLR             Glute bridge  3x15 3\"  3x12 3\"    3x10 3\" 3x10 3\" 3x10 3\"   Leg press  3x12 225#   3x12 225#  3x12 225# 3x10 245#     Resisted side step             TB resisted thoracic rotation BLTB x20 ea            Walking lunge  X4 laps           Scorpions     X15 ea X15 ea X20 ea       Piriformis stretch w/ rotation    X15 ea X15 ea        Hip IR in quadriped     X30 3\" ea        Elevated heel squat      2x10       Deep squat hold      2'       90-90 hip IR       2x10 ea       Ther Activity                                       Gait Training                                       Modalities                                            "

## 2024-11-21 ENCOUNTER — OFFICE VISIT (OUTPATIENT)
Dept: PHYSICAL THERAPY | Facility: CLINIC | Age: 39
End: 2024-11-21
Payer: COMMERCIAL

## 2024-11-21 DIAGNOSIS — M54.50 CHRONIC BILATERAL LOW BACK PAIN, UNSPECIFIED WHETHER SCIATICA PRESENT: Primary | ICD-10-CM

## 2024-11-21 DIAGNOSIS — M25.512 CHRONIC PAIN OF BOTH SHOULDERS: ICD-10-CM

## 2024-11-21 DIAGNOSIS — M54.2 NECK PAIN: ICD-10-CM

## 2024-11-21 DIAGNOSIS — G89.29 CHRONIC PAIN OF BOTH SHOULDERS: ICD-10-CM

## 2024-11-21 DIAGNOSIS — G89.29 CHRONIC BILATERAL LOW BACK PAIN, UNSPECIFIED WHETHER SCIATICA PRESENT: Primary | ICD-10-CM

## 2024-11-21 DIAGNOSIS — M79.18 MYOFASCIAL PAIN SYNDROME: ICD-10-CM

## 2024-11-21 DIAGNOSIS — M25.511 CHRONIC PAIN OF BOTH SHOULDERS: ICD-10-CM

## 2024-11-21 PROCEDURE — 97112 NEUROMUSCULAR REEDUCATION: CPT

## 2024-11-21 PROCEDURE — 97140 MANUAL THERAPY 1/> REGIONS: CPT

## 2024-11-21 NOTE — PROGRESS NOTES
Daily Note     Today's date: 2024  Patient name: Pedro Morse  : 1985  MRN: [id number]  Referring provider: Everardo Nguyen MD  Dx:   Encounter Diagnosis     ICD-10-CM    1. Chronic bilateral low back pain, unspecified whether sciatica present  M54.50     G89.29       2. Neck pain  M54.2       3. Chronic pain of both shoulders  M25.511     G89.29     M25.512       4. Myofascial pain syndrome  M79.18         Start Time: 1152  Stop Time: 1234  Total time in clinic (min): 42 minutes    Subjective: Patient reports that his lumbar and cervical spine feel very stiff today and that his morning stretching routine has not decreased the tension.       Objective: See treatment diary below      Assessment: Tolerated treatment well. Manuals were not successful in decreasing tension sensation in cervical spine. Pt reported no changes in lumbar symptom irritability. Pt still challenge by lumbar extension isometric, increase hold time NV. Patient demonstrated fatigue post treatment, exhibited good technique with therapeutic exercises, and would benefit from continued PT for increased mobility, increased strength, and decreased symptom irritability.       Plan: Continue per plan of care.  Progress treatment as tolerated.       Precautions: High symptom irritability     POC expires Unit limit Auth Expiration date PT/OT/ST + Visit Limit?   24 BOMN N/A BOMN                           Visit/Unit Tracking  AUTH Status:  Date 10/22 10/24 10/29 11/5 11/12 11/14 11/19 11/21   N/A Used 16 17 18 19 20 21 22 23    Remaining              HEP: [id number]   Manuals 10/10 10/15 10/17 10/22 10/24 10/29 11/5 11/12 11/14 11/19 11/21   Thoracic G5 JMK  JMK JMK JMK  JMK  JMK JMK JMK   Lumbar G5 JMK JMK  JMK     JMK     Thoracic G3-4 JMK  JMK JMK JMK  CORTNEYK   CORTNEYK CORTNEYK   Cervical distraction              CTJ G5           JMK   Cervical STM   JMK IASTM           LAD JMK JMK  JMK   JMK CORTNEYK CORTNEYK CORTNEYK    Cupping protocol              Lumbar  "gapping and QL release  JMK  JMK JMK         Lumbar manual distraction  JMK         JMK   SI LAD  JMK            Cervical G5 mob        JMK   JMK   Upper trap IASTM          JMK    Neuro Re-Ed              Kieser Row 3x12 38#  3x12 40#    3x12 40#  3x12 40#  3x12 40#   Kieser Shoulder ext 3x12 20#      3x12 22#  3x12 20#  3x12 16#   Robberies   3x12 15#           DNF               Prone scap retraction   3\" x20    3\" x20   3\" x20    Repeated extensions  x30  X30 3\" X30 3\" w/ L hip flex X30 3\" w/ L hip flex        Side lying hip abduction        W/ plank 3x10 ea W/ plank 3x10 ea W/ plank 2x10 ea W/ plank 2x10 ea   Bird dog    2x10 ea   2x10 ea 3x10 ea 3x10 ea 2x10 ea 2x10 ea   Dead bug       2x10 ea 3x10 ea 3x10 ea 2x10 ea 2x10 ea   Cervical retraction w/ shoulder shrug              Supine sciatic glide  2x20 ea            Lumbar extension off table      30\" x3 30\" x5 30\" x5 40\" x5 1' x4 1' x4   Lumbar extension w/ Prone Y        15\" x3 2x10     Ther Ex              HEP education              UE bike 5' upright 5' upright  3'/3' 5' upright  3'/3' 5' upright 3'/3' 5' upright 5' upright 5' upright 5' upright   Thoracic extensions              Thoracic circuit          5'    Snags              Tspine thread the needle   X15 ea           SLR              Glute bridge  3x15 3\"  3x12 3\"    3x10 3\" 3x10 3\" 3x10 3\" 3x12 3\"   Leg press  3x12 225#   3x12 225#  3x12 225# 3x10 245#      Resisted side step              TB resisted thoracic rotation BLTB x20 ea             Walking lunge  X4 laps            Scorpions     X15 ea X15 ea X20 ea        Piriformis stretch w/ rotation    X15 ea X15 ea         Hip IR in quadriped     X30 3\" ea         Elevated heel squat      2x10        Deep squat hold      2'        90-90 hip IR       2x10 ea        Ther Activity                                          Gait Training                                          Modalities                                               "

## 2024-11-26 ENCOUNTER — APPOINTMENT (OUTPATIENT)
Dept: PHYSICAL THERAPY | Facility: CLINIC | Age: 39
End: 2024-11-26
Payer: COMMERCIAL

## 2024-11-29 ENCOUNTER — HOSPITAL ENCOUNTER (OUTPATIENT)
Dept: MRI IMAGING | Facility: HOSPITAL | Age: 39
End: 2024-11-29
Payer: COMMERCIAL

## 2024-11-29 DIAGNOSIS — M54.41 CHRONIC BILATERAL LOW BACK PAIN WITH RIGHT-SIDED SCIATICA: ICD-10-CM

## 2024-11-29 DIAGNOSIS — G89.29 CHRONIC BILATERAL LOW BACK PAIN WITH RIGHT-SIDED SCIATICA: ICD-10-CM

## 2024-11-29 PROCEDURE — 72148 MRI LUMBAR SPINE W/O DYE: CPT

## 2024-12-02 ENCOUNTER — RESULTS FOLLOW-UP (OUTPATIENT)
Dept: PAIN MEDICINE | Facility: CLINIC | Age: 39
End: 2024-12-02

## 2024-12-02 DIAGNOSIS — M54.16 LUMBAR RADICULOPATHY: ICD-10-CM

## 2024-12-02 DIAGNOSIS — G95.89 INTRADURAL MASS (HCC): Primary | ICD-10-CM

## 2024-12-03 ENCOUNTER — OFFICE VISIT (OUTPATIENT)
Dept: PHYSICAL THERAPY | Facility: CLINIC | Age: 39
End: 2024-12-03
Payer: COMMERCIAL

## 2024-12-03 DIAGNOSIS — M54.50 CHRONIC BILATERAL LOW BACK PAIN, UNSPECIFIED WHETHER SCIATICA PRESENT: Primary | ICD-10-CM

## 2024-12-03 DIAGNOSIS — M25.512 CHRONIC PAIN OF BOTH SHOULDERS: ICD-10-CM

## 2024-12-03 DIAGNOSIS — G89.29 CHRONIC PAIN OF BOTH SHOULDERS: ICD-10-CM

## 2024-12-03 DIAGNOSIS — M54.2 NECK PAIN: ICD-10-CM

## 2024-12-03 DIAGNOSIS — M79.18 MYOFASCIAL PAIN SYNDROME: ICD-10-CM

## 2024-12-03 DIAGNOSIS — G89.29 CHRONIC BILATERAL LOW BACK PAIN, UNSPECIFIED WHETHER SCIATICA PRESENT: Primary | ICD-10-CM

## 2024-12-03 DIAGNOSIS — M25.511 CHRONIC PAIN OF BOTH SHOULDERS: ICD-10-CM

## 2024-12-03 PROCEDURE — 97110 THERAPEUTIC EXERCISES: CPT

## 2024-12-03 PROCEDURE — 97112 NEUROMUSCULAR REEDUCATION: CPT

## 2024-12-03 NOTE — PROGRESS NOTES
Daily Note     Today's date: 12/3/2024  Patient name: Pedro Morse  : 1985  MRN: [id number]  Referring provider: Everardo Nguyen MD  Dx:   Encounter Diagnosis     ICD-10-CM    1. Chronic bilateral low back pain, unspecified whether sciatica present  M54.50     G89.29       2. Neck pain  M54.2       3. Chronic pain of both shoulders  M25.511     G89.29     M25.512       4. Myofascial pain syndrome  M79.18         Start Time: 1718  Stop Time: 1801  Total time in clinic (min): 43 minutes    Subjective: Patient reports that his L mid back under scapula is very bothersome along w/ his lumbar spine.       Objective: See treatment diary below      Assessment: Tolerated treatment well. Trailed L Rib mob, did not make any changes to symptom irritability under L scapula. No changes in upper body symptom irritability. Added bear planks and bear plank w/ hip extension to further challenge and strengthen Pt's lumbar extensors and stabilizers. No exacerbation of lumbar pain during new exercises. Patient demonstrated fatigue post treatment, exhibited good technique with therapeutic exercises, and would benefit from continued PT for increased mobility, increased strength, and decreased symptom irritability.       Plan: Continue per plan of care.  Progress treatment as tolerated.       Precautions: High symptom irritability     POC expires Unit limit Auth Expiration date PT/OT/ST + Visit Limit?   24 BOMN N/A BOMN                           Visit/Unit Tracking  AUTH Status:  Date 10/22 10/24 10/29 11/5 11/12 11/14 11/19 11/21 12/3   N/A Used 16 17 18 19 20 21 22 23 24    Remaining               HEP: [id number]   Manuals 10/10 10/15 10/17 10/22 10/24 10/29 11/5 11/12 11/14 11/19 11/21 12/3   Thoracic G5 JMK  JMK JMK JMK  JMK  JMK JMK JMK JMK/ L rib G 5 mob   Lumbar G5 JMK JMK  JMK     JMK      Thoracic G3-4 JMK  JMK JMK JMK  JMK   JMK JMK    Cervical distraction               CTJ G5           JMK    Cervical STM   JMK  "IASTM            LAD JMK JMK  JMK   JMK JMK JMK JMK     Cupping protocol               Lumbar gapping and QL release  JMK  JMK JMK          Lumbar manual distraction  JMK         JMK    SI LAD  JMK             Cervical G5 mob        JMK   JMK    Upper trap IASTM          JMK     Neuro Re-Ed               Kieser Row 3x12 38#  3x12 40#    3x12 40#  3x12 40#  3x12 40# 3x12 40#   Kieser Shoulder ext 3x12 20#      3x12 22#  3x12 20#  3x12 16# 3x12 20#   Robberies   3x12 15#            DNF                Prone scap retraction   3\" x20    3\" x20   3\" x20     Repeated extensions  x30  X30 3\" X30 3\" w/ L hip flex X30 3\" w/ L hip flex         Side lying hip abduction        W/ plank 3x10 ea W/ plank 3x10 ea W/ plank 2x10 ea W/ plank 2x10 ea W/ plank 2x10 ea   Bird dog    2x10 ea   2x10 ea 3x10 ea 3x10 ea 2x10 ea 2x10 ea    Dead bug       2x10 ea 3x10 ea 3x10 ea 2x10 ea 2x10 ea 2x10 ea   Cervical retraction w/ shoulder shrug               Supine sciatic glide  2x20 ea             Lumbar extension off table      30\" x3 30\" x5 30\" x5 40\" x5 1' x4 1' x4 1\" x3   Bear plank w/ leg lift            3x10 ea   Lumbar extension w/ Prone Y        15\" x3 2x10      Ther Ex               HEP education               UE bike 5' upright 5' upright  3'/3' 5' upright  3'/3' 5' upright 3'/3' 5' upright 5' upright 5' upright 5' upright 3'/3'   Thoracic extensions               Thoracic circuit          5'     Snags               Tspine thread the needle   X15 ea         X30 ea   SLR               Glute bridge  3x15 3\"  3x12 3\"    3x10 3\" 3x10 3\" 3x10 3\" 3x12 3\" 3x12 3\"   Leg press  3x12 225#   3x12 225#  3x12 225# 3x10 245#       Resisted side step               TB resisted thoracic rotation BLTB x20 ea              Walking lunge  X4 laps             Scorpions     X15 ea X15 ea X20 ea         Piriformis stretch w/ rotation    X15 ea X15 ea          Hip IR in quadriped     X30 3\" ea          Elevated heel squat      2x10         Deep squat hold     "  2' 90-90 hip IR       2x10 ea         Ther Activity                                             Gait Training                                             Modalities

## 2024-12-05 ENCOUNTER — APPOINTMENT (OUTPATIENT)
Dept: PHYSICAL THERAPY | Facility: CLINIC | Age: 39
End: 2024-12-05
Payer: COMMERCIAL

## 2024-12-09 NOTE — RESULT ENCOUNTER NOTE
S/w pt and advised same. Per neurosx referral, a message was left for pt to schedule. Pt states he will call them back, but he has been out of town for work.

## 2024-12-17 ENCOUNTER — TELEPHONE (OUTPATIENT)
Dept: NEUROSURGERY | Facility: CLINIC | Age: 39
End: 2024-12-17

## 2024-12-17 ENCOUNTER — APPOINTMENT (OUTPATIENT)
Dept: PHYSICAL THERAPY | Facility: CLINIC | Age: 39
End: 2024-12-17
Payer: COMMERCIAL
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
## 2024-12-19 ENCOUNTER — OFFICE VISIT (OUTPATIENT)
Dept: PHYSICAL THERAPY | Facility: CLINIC | Age: 39
End: 2024-12-19
Payer: COMMERCIAL

## 2024-12-19 DIAGNOSIS — G89.29 CHRONIC BILATERAL LOW BACK PAIN, UNSPECIFIED WHETHER SCIATICA PRESENT: Primary | ICD-10-CM

## 2024-12-19 DIAGNOSIS — M25.512 CHRONIC PAIN OF BOTH SHOULDERS: ICD-10-CM

## 2024-12-19 DIAGNOSIS — M54.50 CHRONIC BILATERAL LOW BACK PAIN, UNSPECIFIED WHETHER SCIATICA PRESENT: Primary | ICD-10-CM

## 2024-12-19 DIAGNOSIS — M79.18 MYOFASCIAL PAIN SYNDROME: ICD-10-CM

## 2024-12-19 DIAGNOSIS — M25.511 CHRONIC PAIN OF BOTH SHOULDERS: ICD-10-CM

## 2024-12-19 DIAGNOSIS — G89.29 CHRONIC PAIN OF BOTH SHOULDERS: ICD-10-CM

## 2024-12-19 DIAGNOSIS — M54.2 NECK PAIN: ICD-10-CM

## 2024-12-19 PROCEDURE — 97112 NEUROMUSCULAR REEDUCATION: CPT

## 2024-12-19 PROCEDURE — 97110 THERAPEUTIC EXERCISES: CPT

## 2024-12-19 NOTE — PROGRESS NOTES
Daily Note     Today's date: 2024  Patient name: Pedro Morse  : 1985  MRN: [id number]  Referring provider: Everardo Nguyen MD  Dx:   Encounter Diagnosis     ICD-10-CM    1. Chronic bilateral low back pain, unspecified whether sciatica present  M54.50     G89.29       2. Neck pain  M54.2       3. Chronic pain of both shoulders  M25.511     G89.29     M25.512       4. Myofascial pain syndrome  M79.18         Start Time: 1153  Stop Time: 1236  Total time in clinic (min): 43 minutes    Subjective: Patient reports that his MRI results are in and that he has a lump on his spine. Due to the results he has to see a neurosurgeon in the new year.       Objective: See treatment diary below      Assessment: Tolerated treatment well. MRI results showed grade 1 anterolisthesis spondylolisthesis of L5 on S1, DC spinal extension exercise due to results until Pt talks to surgeon. Focused on spinal stability toady. Pt would like to continue PT for BUE and lumbar stability. Thoracic G5 mobilization was successful in reducing L shoulder blade symptom irritability. Patient demonstrated fatigue post treatment, exhibited good technique with therapeutic exercises, and would benefit from continued PT for increased mobility, increased strength, and decreased symptom irritability.       Plan: Continue per plan of care.  Progress treatment as tolerated.       Precautions: High symptom irritability     POC expires Unit limit Auth Expiration date PT/OT/ST + Visit Limit?   24 BOMN N/A BOMN                           Visit/Unit Tracking  AUTH Status:  Date 10/22 10/24 10/29 11/5 11/12 11/14 11/19 11/21 12/3 12/19   N/A Used 16 17 18 19 20 21 22 23 24 25    Remaining                HEP: [id number]   Manuals 10/10 10/15 10/17 10/22 10/24 10/29 11/5 11/12 11/14 11/19 11/21 12/3 12/19   Thoracic G5 JMK  JMK JMK JMK  JMK  JMK JMK JMK JMK/ L rib G 5 mob JMK/ L rib G 5 mob   Lumbar G5 JMK JMK  JMK     JMK       Thoracic G3-4 JMK  CORTNEYK  "JMK JMK  JMK   JMK JMK     Cervical distraction                CTJ G5           JMK     Cervical STM   JMK IASTM             LAD JMK JMK  JMK   JMK JMK JMK JMK      Cupping protocol                Lumbar gapping and QL release  JMK  JMK JMK           Lumbar manual distraction  JMK         JMK     SI LAD  JMK              Cervical G5 mob        JMK   JMK     Upper trap IASTM          JMK      Neuro Re-Ed                Kieser Row 3x12 38#  3x12 40#    3x12 40#  3x12 40#  3x12 40# 3x12 40#    Kieser Shoulder ext 3x12 20#      3x12 22#  3x12 20#  3x12 16# 3x12 20#    Robberies   3x12 15#             DNF                 Prone scap retraction   3\" x20    3\" x20   3\" x20      Repeated extensions  x30  X30 3\" X30 3\" w/ L hip flex X30 3\" w/ L hip flex          Side lying hip abduction        W/ plank 3x10 ea W/ plank 3x10 ea W/ plank 2x10 ea W/ plank 2x10 ea W/ plank 2x10 ea W/ plank 2x10 ea   Bird dog    2x10 ea   2x10 ea 3x10 ea 3x10 ea 2x10 ea 2x10 ea     Dead bug       2x10 ea 3x10 ea 3x10 ea 2x10 ea 2x10 ea 2x10 ea    Plank             X2 till failure.    Cervical retraction w/ shoulder shrug                Supine sciatic glide  2x20 ea              Lumbar extension off table      30\" x3 30\" x5 30\" x5 40\" x5 1' x4 1' x4 1\" x3    Bear plank w/ leg lift            3x10 ea 3x10 ea   Lumbar extension w/ Prone Y        15\" x3 2x10       Ther Ex                HEP education             10'   UE bike 5' upright 5' upright  3'/3' 5' upright  3'/3' 5' upright 3'/3' 5' upright 5' upright 5' upright 5' upright 3'/3' 5' upright    Thoracic extensions                Thoracic circuit          5'      Snags                Tspine thread the needle   X15 ea         X30 ea    SLR                Glute bridge  3x15 3\"  3x12 3\"    3x10 3\" 3x10 3\" 3x10 3\" 3x12 3\" 3x12 3\" SL 3x10 ea    Leg press  3x12 225#   3x12 225#  3x12 225# 3x10 245#        Resisted side step                TB resisted thoracic rotation BLTB x20 ea               Walking " "lunge  X4 laps              Scorpions     X15 ea X15 ea X20 ea          Piriformis stretch w/ rotation    X15 ea X15 ea           Hip IR in quadriped     X30 3\" ea           Elevated heel squat      2x10          Deep squat hold      2'          90-90 hip IR       2x10 ea          Ther Activity                                                Gait Training                                                Modalities                                                     "

## 2025-01-02 ENCOUNTER — OFFICE VISIT (OUTPATIENT)
Dept: PHYSICAL THERAPY | Facility: CLINIC | Age: 40
End: 2025-01-02
Payer: COMMERCIAL

## 2025-01-02 DIAGNOSIS — M25.512 CHRONIC PAIN OF BOTH SHOULDERS: ICD-10-CM

## 2025-01-02 DIAGNOSIS — M54.2 NECK PAIN: ICD-10-CM

## 2025-01-02 DIAGNOSIS — M54.50 CHRONIC BILATERAL LOW BACK PAIN, UNSPECIFIED WHETHER SCIATICA PRESENT: Primary | ICD-10-CM

## 2025-01-02 DIAGNOSIS — G89.29 CHRONIC BILATERAL LOW BACK PAIN, UNSPECIFIED WHETHER SCIATICA PRESENT: Primary | ICD-10-CM

## 2025-01-02 DIAGNOSIS — M79.18 MYOFASCIAL PAIN SYNDROME: ICD-10-CM

## 2025-01-02 DIAGNOSIS — G89.29 CHRONIC PAIN OF BOTH SHOULDERS: ICD-10-CM

## 2025-01-02 DIAGNOSIS — M25.511 CHRONIC PAIN OF BOTH SHOULDERS: ICD-10-CM

## 2025-01-02 PROCEDURE — 97112 NEUROMUSCULAR REEDUCATION: CPT

## 2025-01-02 PROCEDURE — 97110 THERAPEUTIC EXERCISES: CPT

## 2025-01-02 PROCEDURE — 97140 MANUAL THERAPY 1/> REGIONS: CPT

## 2025-01-02 NOTE — PROGRESS NOTES
Daily Note     Today's date: 2025  Patient name: Pedro Morse  : 1985  MRN: [id number]  Referring provider: Everardo Nguyen MD  Dx:   Encounter Diagnosis     ICD-10-CM    1. Chronic bilateral low back pain, unspecified whether sciatica present  M54.50     G89.29       2. Neck pain  M54.2       3. Chronic pain of both shoulders  M25.511     G89.29     M25.512       4. Myofascial pain syndrome  M79.18         Start Time: 1150  Stop Time: 1235  Total time in clinic (min): 45 minutes    Subjective: Patient reports that he has his follow-up for the lumbar spine next week to discuss the imaging results and next steps. Pt reports that the pain in his thoracic spine is more bothersome today than anything else.       Objective: See treatment diary below      Assessment: Tolerated treatment well. Pt visibly limited w/ thoracic rotation to the R compared to the L. Pt had increase thoracic mobility at the conclusion of today's session. Pt has not regressed in parascapular strength. Monitor Pt symptoms w/ possible re-eval NV following appointment w/ physician. Patient demonstrated fatigue post treatment, exhibited good technique with therapeutic exercises, and would benefit from continued PT for increased mobility, increased strength, and decreased symptom irritability.       Plan: Continue per plan of care.  Progress treatment as tolerated.       Precautions: High symptom irritability     POC expires Unit limit Auth Expiration date PT/OT/ST + Visit Limit?   24 BOMN N/A BOMN                           Visit/Unit Tracking  AUTH Status:  Date 10/22 10/24 10/29 11/5 11/12 11/14 11/19 11/21 12/3 12/19 1/2   N/A Used 16 17 18 19 20 21 22 23 24 25 26    Remaining                 HEP: [id number]   Manuals 10/24 10/29 11/5 11/12 11/14 11/19 11/21 12/3 12/19 1/2   Thoracic G5 JMK  JMK  JMK JMK JMK JMK/ L rib G 5 mob JMK/ L rib G 5 mob JMK   Lumbar G5     JMK        Thoracic G3-4 JMK  JMK   JMK JMK   JMK   Cervical  "distraction             CTJ G5       JMK      Cervical STM             LAD   JMK JMK JMK JMK       Cupping protocol             Lumbar gapping and QL release JMK            Lumbar manual distraction       JMK      SI LAD             Cervical G5 mob    JMK   JMK      Trap IASTM      JMK    JMK low trap   Neuro Re-Ed             Kieser Row   3x12 40#  3x12 40#  3x12 40# 3x12 40#  3x10 40#   Kieser Shoulder ext   3x12 22#  3x12 20#  3x12 16# 3x12 20#  3x10 18#   Robberies          3x10 10#   DNF              Prone scap retraction   3\" x20   3\" x20       Repeated extensions X30 3\" w/ L hip flex X30 3\" w/ L hip flex           Side lying hip abduction    W/ plank 3x10 ea W/ plank 3x10 ea W/ plank 2x10 ea W/ plank 2x10 ea W/ plank 2x10 ea W/ plank 2x10 ea    Bird dog   2x10 ea 3x10 ea 3x10 ea 2x10 ea 2x10 ea      Dead bug   2x10 ea 3x10 ea 3x10 ea 2x10 ea 2x10 ea 2x10 ea     Plank         X2 till failure.     Cervical retraction w/ shoulder shrug             Supine sciatic glide             Lumbar extension off table  30\" x3 30\" x5 30\" x5 40\" x5 1' x4 1' x4 1\" x3     Bear plank w/ leg lift        3x10 ea 3x10 ea    Lumbar extension w/ Prone Y    15\" x3 2x10        Ther Ex             HEP education         10'    UE bike 3'/3' 5' upright 3'/3' 5' upright 5' upright 5' upright 5' upright 3'/3' 5' upright  3'/3'   Thoracic extensions             Thoracic circuit      5'    5'   Open books          X20 3\" ea   Snags             Tspine thread the needle        X30 ea     SLR             Glute bridge    3x10 3\" 3x10 3\" 3x10 3\" 3x12 3\" 3x12 3\" SL 3x10 ea     Leg press 3x12 225#  3x12 225# 3x10 245#         Resisted side step             TB resisted thoracic rotation             Walking lunge             Scorpions  X15 ea X20 ea           Piriformis stretch w/ rotation X15 ea            Hip IR in quadriped X30 3\" ea            Elevated heel squat  2x10           Deep squat hold  2'           90-90 hip IR   2x10 ea           Ther " Activity                                       Gait Training                                       Modalities                                             WDL

## 2025-01-07 ENCOUNTER — OFFICE VISIT (OUTPATIENT)
Dept: PHYSICAL THERAPY | Facility: CLINIC | Age: 40
End: 2025-01-07
Payer: COMMERCIAL

## 2025-01-07 DIAGNOSIS — M25.512 CHRONIC PAIN OF BOTH SHOULDERS: ICD-10-CM

## 2025-01-07 DIAGNOSIS — M79.18 MYOFASCIAL PAIN SYNDROME: ICD-10-CM

## 2025-01-07 DIAGNOSIS — M25.511 CHRONIC PAIN OF BOTH SHOULDERS: ICD-10-CM

## 2025-01-07 DIAGNOSIS — G89.29 CHRONIC BILATERAL LOW BACK PAIN, UNSPECIFIED WHETHER SCIATICA PRESENT: Primary | ICD-10-CM

## 2025-01-07 DIAGNOSIS — G89.29 CHRONIC PAIN OF BOTH SHOULDERS: ICD-10-CM

## 2025-01-07 DIAGNOSIS — M54.2 NECK PAIN: ICD-10-CM

## 2025-01-07 DIAGNOSIS — M54.50 CHRONIC BILATERAL LOW BACK PAIN, UNSPECIFIED WHETHER SCIATICA PRESENT: Primary | ICD-10-CM

## 2025-01-07 PROCEDURE — 97140 MANUAL THERAPY 1/> REGIONS: CPT

## 2025-01-07 PROCEDURE — 97530 THERAPEUTIC ACTIVITIES: CPT

## 2025-01-07 PROCEDURE — 97110 THERAPEUTIC EXERCISES: CPT

## 2025-01-07 NOTE — PROGRESS NOTES
Daily Note     Today's date: 2025  Patient name: Pedro Morse  : 1985  MRN: [id number]  Referring provider: Everardo Nguyen MD  Dx:   Encounter Diagnosis     ICD-10-CM    1. Chronic bilateral low back pain, unspecified whether sciatica present  M54.50     G89.29       2. Neck pain  M54.2       3. Chronic pain of both shoulders  M25.511     G89.29     M25.512       4. Myofascial pain syndrome  M79.18           Start Time: 1720  Stop Time: 1800  Total time in clinic (min): 40 minutes    Subjective: Patient reports that he threw his low back out on Thursday and had to cancel because he was unable to get his socks on. Pt reports he can barely extend his spine and that the pain was not relieved through any HEP over the weekend. Pt tried getting a massage, but it made difference in symptom irritability.       Objective: See treatment diary below      Assessment: Tolerated treatment well. Pt presents in a forward flexed posture w/ heavy symptom irritability when attempting extension. Completed cupping protocol in prone followed by prone static extension to slowly integrate lumbar extension, Pt reported no increase in symptoms during these interventions. Pt had moderate increase in lumbar extension following cupping and presented w/ less forward flexed posture. Added treadmill walking to slowly load Pt's lumbar spine in extension. Pt had lateral shift to the R, completed later al glide to correct posture shift. Educated Pt on lateral glides for home. Patient demonstrated fatigue post treatment, exhibited good technique with therapeutic exercises, and would benefit from continued PT for increased mobility, increased strength, and decreased symptom irritability.       Plan: Continue per plan of care.  Progress treatment as tolerated.       Precautions: High symptom irritability     POC expires Unit limit Auth Expiration date PT/OT/ST + Visit Limit?   24 BOMN N/A BOMN                           Visit/Unit  "Tracking  AUTH Status:  Date 10/22 10/24 10/29 11/5 11/12 11/14 11/19 11/21 12/3 12/19 1/2 1/7   N/A Used 16 17 18 19 20 21 22 23 24 25 26 27    Remaining                  HEP: [id number]   Manuals 10/24 10/29 11/5 11/12 11/14 11/19 11/21 12/3 12/19 1/2 1/7   Thoracic G5 JMK  JMK  JMK JMK JMK JMK/ L rib G 5 mob JMK/ L rib G 5 mob JMK    Lumbar G5     JMK         Thoracic G3-4 JMK  JMK   JMK JMK   JMK    Cervical distraction              CTJ G5       JMK       Cervical STM              LAD   JMK JMK JMK JMK        Cupping protocol           JMK 10'   Lumbar gapping and QL release JMK             Lumbar manual distraction       JMK       SI LAD              Cervical G5 mob    JMK   JMK       Trap IASTM      JMK    JMK low trap    Neuro Re-Ed              Kieser Row   3x12 40#  3x12 40#  3x12 40# 3x12 40#  3x10 40#    Kieser Shoulder ext   3x12 22#  3x12 20#  3x12 16# 3x12 20#  3x10 18#    Robberies          3x10 10#    DNF               Prone scap retraction   3\" x20   3\" x20        Repeated extensions X30 3\" w/ L hip flex X30 3\" w/ L hip flex            Side lying hip abduction    W/ plank 3x10 ea W/ plank 3x10 ea W/ plank 2x10 ea W/ plank 2x10 ea W/ plank 2x10 ea W/ plank 2x10 ea     Bird dog   2x10 ea 3x10 ea 3x10 ea 2x10 ea 2x10 ea       Dead bug   2x10 ea 3x10 ea 3x10 ea 2x10 ea 2x10 ea 2x10 ea      Plank         X2 till failure.      Cervical retraction w/ shoulder shrug              Supine sciatic glide              Lumbar extension off table  30\" x3 30\" x5 30\" x5 40\" x5 1' x4 1' x4 1\" x3      Bear plank w/ leg lift        3x10 ea 3x10 ea     Lumbar extension w/ Prone Y    15\" x3 2x10         Ther Ex              HEP education         10'  10'   UE bike 3'/3' 5' upright 3'/3' 5' upright 5' upright 5' upright 5' upright 3'/3' 5' upright  3'/3' 5' upright    Prone extension static           5'   Lateral glides           R 2x10 5\"   Thoracic extensions              Thoracic circuit      5'    5'    Open books        " "  X20 3\" ea    Snags              Tspine thread the needle        X30 ea      SLR              Glute bridge    3x10 3\" 3x10 3\" 3x10 3\" 3x12 3\" 3x12 3\" SL 3x10 ea      Leg press 3x12 225#  3x12 225# 3x10 245#          Resisted side step              TB resisted thoracic rotation              Walking lunge              Scorpions  X15 ea X20 ea            Piriformis stretch w/ rotation X15 ea             Hip IR in quadriped X30 3\" ea             Elevated heel squat  2x10            Deep squat hold  2'            90-90 hip IR   2x10 ea            Ther Activity              Treadmill walking           10' speed 3                 Gait Training                                          Modalities                                               "

## 2025-01-09 ENCOUNTER — CONSULT (OUTPATIENT)
Dept: NEUROSURGERY | Facility: CLINIC | Age: 40
End: 2025-01-09
Payer: COMMERCIAL

## 2025-01-09 ENCOUNTER — OFFICE VISIT (OUTPATIENT)
Dept: PHYSICAL THERAPY | Facility: CLINIC | Age: 40
End: 2025-01-09
Payer: COMMERCIAL

## 2025-01-09 VITALS
WEIGHT: 165 LBS | RESPIRATION RATE: 18 BRPM | BODY MASS INDEX: 27.49 KG/M2 | HEIGHT: 65 IN | HEART RATE: 72 BPM | OXYGEN SATURATION: 99 % | SYSTOLIC BLOOD PRESSURE: 118 MMHG | DIASTOLIC BLOOD PRESSURE: 76 MMHG | TEMPERATURE: 97.6 F

## 2025-01-09 DIAGNOSIS — M54.16 LUMBAR RADICULOPATHY: ICD-10-CM

## 2025-01-09 DIAGNOSIS — M51.369 DDD (DEGENERATIVE DISC DISEASE), LUMBAR: ICD-10-CM

## 2025-01-09 DIAGNOSIS — G95.89 INTRADURAL MASS (HCC): ICD-10-CM

## 2025-01-09 DIAGNOSIS — D17.79 INTRADURAL LIPOMA OF SPINE: Primary | ICD-10-CM

## 2025-01-09 DIAGNOSIS — M25.511 CHRONIC PAIN OF BOTH SHOULDERS: ICD-10-CM

## 2025-01-09 DIAGNOSIS — M54.2 NECK PAIN: ICD-10-CM

## 2025-01-09 DIAGNOSIS — G89.29 CHRONIC BILATERAL LOW BACK PAIN, UNSPECIFIED WHETHER SCIATICA PRESENT: Primary | ICD-10-CM

## 2025-01-09 DIAGNOSIS — M25.512 CHRONIC PAIN OF BOTH SHOULDERS: ICD-10-CM

## 2025-01-09 DIAGNOSIS — R25.8 CLONUS: ICD-10-CM

## 2025-01-09 DIAGNOSIS — M79.18 MYOFASCIAL PAIN SYNDROME: ICD-10-CM

## 2025-01-09 DIAGNOSIS — G89.29 CHRONIC PAIN OF BOTH SHOULDERS: ICD-10-CM

## 2025-01-09 DIAGNOSIS — Q06.8 TETHERED CORD (HCC): ICD-10-CM

## 2025-01-09 DIAGNOSIS — M54.50 CHRONIC BILATERAL LOW BACK PAIN, UNSPECIFIED WHETHER SCIATICA PRESENT: Primary | ICD-10-CM

## 2025-01-09 PROCEDURE — 97140 MANUAL THERAPY 1/> REGIONS: CPT

## 2025-01-09 PROCEDURE — 97110 THERAPEUTIC EXERCISES: CPT

## 2025-01-09 PROCEDURE — 99204 OFFICE O/P NEW MOD 45 MIN: CPT | Performed by: NEUROLOGICAL SURGERY

## 2025-01-09 NOTE — ASSESSMENT & PLAN NOTE
Congenital  Orders:    Ambulatory referral to Spine & Pain Management; Future    MRI cervical spine without contrast; Future    MRI thoracic spine without contrast; Future    Ambulatory referral to Physical Therapy; Future
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
## 2025-01-09 NOTE — ASSESSMENT & PLAN NOTE
This degenerative disc disease with disc space collapse and anterior and posterior projecting osteophytes do not cause compressive neuropathies however is reflective of the magnitude of force that his spine has been subjected to over time  Orders:    Ambulatory referral to Spine & Pain Management; Future    MRI cervical spine without contrast; Future    MRI thoracic spine without contrast; Future    Ambulatory referral to Physical Therapy; Future

## 2025-01-09 NOTE — PROGRESS NOTES
Daily Note     Today's date: 2025  Patient name: Pedro Morse  : 1985  MRN: [id number]  Referring provider: Everardo Nguyen MD  Dx:   Encounter Diagnosis     ICD-10-CM    1. Chronic bilateral low back pain, unspecified whether sciatica present  M54.50     G89.29       2. Neck pain  M54.2       3. Chronic pain of both shoulders  M25.511     G89.29     M25.512       4. Myofascial pain syndrome  M79.18         Start Time: 1058  Stop Time: 1138  Total time in clinic (min): 40 minutes    Subjective: Patient reports that the side glides have been feeling okay and that they may be helping the tension. Pain levels are the same.       Objective: See treatment diary below      Assessment: Tolerated treatment well. Utilized lateral glide w/ repeated extensions to maintain Pt's lumbar mobility. Completed cupping again as the Pt requested, provided major relief to overall tension in lumbar spine. Pt reported some symptom reilef post session w/ no exacerbation of symptom irritability. Patient demonstrated fatigue post treatment, exhibited good technique with therapeutic exercises, and would benefit from continued PT for increased mobility, increased strength, and decreased symptom irritability.       Plan: Continue per plan of care.  Progress treatment as tolerated.       Precautions: High symptom irritability     POC expires Unit limit Auth Expiration date PT/OT/ST + Visit Limit?   24 BOMN N/A BOMN                           Visit/Unit Tracking  AUTH Status:  Date 12/3 12/19 1/2 1/7 1/9   N/A Used 24 25 26 27 28    Remaining           HEP: [id number]   Manuals 10/24 10/29 11/5 11/12 11/14 11/19 11/21 12/3 12/19 1/2 1/7 1/9   Thoracic G5 JMK  JMK  JMK JMK JMK JMK/ L rib G 5 mob JMK/ L rib G 5 mob JMK     Lumbar G5     JMK          Thoracic G3-4 JMK  JMK   JMK JMK   JMK     Cervical distraction               CTJ G5       JMK        Cervical STM               LAD   JMK JMK JMK JMK         Cupping protocol            "JMK 10' JMK 10'   Lumbar gapping and QL release JMK              Lumbar manual distraction       JMK        SI LAD               Cervical G5 mob    JMK   JMK        Manual overpressure side glides            JMK    Trap IASTM      JMK    JMK low trap     Neuro Re-Ed               Kieser Row   3x12 40#  3x12 40#  3x12 40# 3x12 40#  3x10 40#     Kieser Shoulder ext   3x12 22#  3x12 20#  3x12 16# 3x12 20#  3x10 18#     Robberies          3x10 10#     DNF                Prone scap retraction   3\" x20   3\" x20         Repeated extensions X30 3\" w/ L hip flex X30 3\" w/ L hip flex             Side lying hip abduction    W/ plank 3x10 ea W/ plank 3x10 ea W/ plank 2x10 ea W/ plank 2x10 ea W/ plank 2x10 ea W/ plank 2x10 ea      Bird dog   2x10 ea 3x10 ea 3x10 ea 2x10 ea 2x10 ea        Dead bug   2x10 ea 3x10 ea 3x10 ea 2x10 ea 2x10 ea 2x10 ea       Plank         X2 till failure.       Cervical retraction w/ shoulder shrug               Supine sciatic glide               Lumbar extension off table  30\" x3 30\" x5 30\" x5 40\" x5 1' x4 1' x4 1\" x3       Bear plank w/ leg lift        3x10 ea 3x10 ea      Lumbar extension w/ Prone Y    15\" x3 2x10          Ther Ex               HEP education         10'  10'    UE bike 3'/3' 5' upright 3'/3' 5' upright 5' upright 5' upright 5' upright 3'/3' 5' upright  3'/3' 5' upright  5' treadmill   Prone extension static           5'    Lateral glides           R 2x10 5\" R 3x10 5\"   Repeated extensions w/ overpressure            3x10   Thoracic extensions               Thoracic circuit      5'    5'     Open books          X20 3\" ea     Snags               Tspine thread the needle        X30 ea       SLR               Glute bridge    3x10 3\" 3x10 3\" 3x10 3\" 3x12 3\" 3x12 3\" SL 3x10 ea    3x10 3\"   Leg press 3x12 225#  3x12 225# 3x10 245#           Resisted side step               TB resisted thoracic rotation               Walking lunge               Scorpions  X15 ea X20 ea             Piriformis " "stretch w/ rotation X15 ea              Hip IR in quadriped X30 3\" ea              Elevated heel squat  2x10                            Deep squat hold  2'             90-90 hip IR   2x10 ea             Ther Activity               Treadmill walking           10' speed 3                   Gait Training                                             Modalities                                                  "

## 2025-01-09 NOTE — ASSESSMENT & PLAN NOTE
This is a congenital abnormality.  He has fat in the filum terminale with involvement of the intradural space in the region of the cord ascending past the conus medullaris.  This lipoma displaces the cord from the left to the right .  the cord is tethered to the level of the inferior aspect of L3.    Generally surgical intervention for tethered cord and sacral lipomas will be based on progressive neurologic deficit associated with power loss and/or spasticity with bowel bladder or sexual dysfunction.  An MRI of the cervical and thoracic cord are important to obtain to look for other congenital abnormalities and specifically to look for syringomyelia.  The follow-up for these disorders is very much dependent upon the spectrum of aberrations identified.    Orders:    Ambulatory referral to Neurosurgery    Ambulatory referral to Spine & Pain Management; Future    MRI cervical spine without contrast; Future    MRI thoracic spine without contrast; Future    Ambulatory referral to Physical Therapy; Future

## 2025-01-09 NOTE — ASSESSMENT & PLAN NOTE
This is likely old and is related to the tethered cord  Orders:    Ambulatory referral to Spine & Pain Management; Future    MRI cervical spine without contrast; Future    MRI thoracic spine without contrast; Future    Ambulatory referral to Physical Therapy; Future

## 2025-01-09 NOTE — ASSESSMENT & PLAN NOTE
Orders:    Ambulatory referral to Neurosurgery    Ambulatory referral to Spine & Pain Management; Future    MRI cervical spine without contrast; Future    MRI thoracic spine without contrast; Future    Ambulatory referral to Physical Therapy; Future

## 2025-01-13 ENCOUNTER — TELEPHONE (OUTPATIENT)
Age: 40
End: 2025-01-13

## 2025-01-13 NOTE — TELEPHONE ENCOUNTER
Caller: Patient    Doctor: SP    Reason for call: Patient would like to know his next plan of care, Patient was ref to [facility] again from neurosurgery    Please advise    Call back#:

## 2025-01-13 NOTE — TELEPHONE ENCOUNTER
1/9 referral from Dr. Gates states   Please consider an EDSI at L4/5. I recommend against injection above L4     Schedule Ov or injection?

## 2025-01-14 ENCOUNTER — OFFICE VISIT (OUTPATIENT)
Dept: PHYSICAL THERAPY | Facility: CLINIC | Age: 40
End: 2025-01-14
Payer: COMMERCIAL

## 2025-01-14 DIAGNOSIS — M54.2 NECK PAIN: ICD-10-CM

## 2025-01-14 DIAGNOSIS — M25.512 CHRONIC PAIN OF BOTH SHOULDERS: ICD-10-CM

## 2025-01-14 DIAGNOSIS — M54.50 CHRONIC BILATERAL LOW BACK PAIN, UNSPECIFIED WHETHER SCIATICA PRESENT: Primary | ICD-10-CM

## 2025-01-14 DIAGNOSIS — M79.18 MYOFASCIAL PAIN SYNDROME: ICD-10-CM

## 2025-01-14 DIAGNOSIS — G89.29 CHRONIC PAIN OF BOTH SHOULDERS: ICD-10-CM

## 2025-01-14 DIAGNOSIS — M25.511 CHRONIC PAIN OF BOTH SHOULDERS: ICD-10-CM

## 2025-01-14 DIAGNOSIS — G89.29 CHRONIC BILATERAL LOW BACK PAIN, UNSPECIFIED WHETHER SCIATICA PRESENT: Primary | ICD-10-CM

## 2025-01-14 PROCEDURE — 97112 NEUROMUSCULAR REEDUCATION: CPT

## 2025-01-14 PROCEDURE — 97140 MANUAL THERAPY 1/> REGIONS: CPT

## 2025-01-14 PROCEDURE — 97110 THERAPEUTIC EXERCISES: CPT

## 2025-01-14 NOTE — PROGRESS NOTES
Daily Note     Today's date: 2025  Patient name: Pedro Morse  : 1985  MRN: [id number]  Referring provider: Everardo Nguyen MD  Dx:   Encounter Diagnosis     ICD-10-CM    1. Chronic bilateral low back pain, unspecified whether sciatica present  M54.50     G89.29       2. Neck pain  M54.2       3. Chronic pain of both shoulders  M25.511     G89.29     M25.512       4. Myofascial pain syndrome  M79.18         Start Time: 1714  Stop Time: 1757  Total time in clinic (min): 43 minutes    Subjective: Patient reports that he is feeling better than last week, but the L sided lumbar pain is still present.       Objective: See treatment diary below      Assessment: Tolerated treatment well. Pt no longer has any visible lateral shift while standing or ambulating. Added repeated extensions w/ table overpressure in standing, Pt demonstrated full lumbar extension ROM. Pt had no major changes in symptom irritability w/ st banding repeated motions, but had major ROM improvements. No major changes following cupping protocol today for symptoms reduction. Added lumbar extensor isometric back into to program, reported no pain w/ exercise. Patient demonstrated fatigue post treatment, exhibited good technique with therapeutic exercises, and would benefit from continued PT for increased mobility, increased strength, and decreased symptom irritability.       Plan: Continue per plan of care.  Progress treatment as tolerated.       Precautions: High symptom irritability     POC expires Unit limit Auth Expiration date PT/OT/ST + Visit Limit?   24 BOMN N/A BOMN                           Visit/Unit Tracking  AUTH Status:  Date 12/3 12/19 1/2 1/7 1/9 1/14   N/A Used 24 25 26 27 28 29    Remaining            HEP: [id number]   Manuals 11/21 12/3 12/19 1/2 1/7 1/9 1/14   Thoracic G5 JMK JMK/ L rib G 5 mob JMK/ L rib G 5 mob JMK      Lumbar G5          Thoracic G3-4 JMK   JMK      Cervical distraction          CTJ G5 JMK        "  Cervical STM          LAD          Cupping protocol     JMK 10' JMK 10' JMK 10'   Lumbar gapping and QL release          Lumbar manual distraction JMK         SI LAD          Cervical G5 mob JMK         Manual overpressure side glides      JMK     Trap IASTM    JMK low trap      Neuro Re-Ed          Kieser Row 3x12 40# 3x12 40#  3x10 40#      Kieser Shoulder ext 3x12 16# 3x12 20#  3x10 18#      Robberies    3x10 10#      DNF           Prone scap retraction          Repeated extensions       3x10   Side lying hip abduction W/ plank 2x10 ea W/ plank 2x10 ea W/ plank 2x10 ea       Bird dog 2x10 ea         Dead bug 2x10 ea 2x10 ea        Plank   X2 till failure.        Cervical retraction w/ shoulder shrug          Supine sciatic glide          Lumbar extension off table 1' x4 1\" x3     1\" x3   Bear plank w/ leg lift  3x10 ea 3x10 ea       Lumbar extension w/ Prone Y          Ther Ex          HEP education   10'  10'     UE bike 5' upright 3'/3' 5' upright  3'/3' 5' upright  5' treadmill 5' treadmill   Prone extension static     5'     Lateral glides     R 2x10 5\" R 3x10 5\"    Repeated extensions w/ overpressure      3x10 3x10 in standing   Thoracic extensions          Thoracic circuit    5'      Open books    X20 3\" ea      Snags          Tspine thread the needle  X30 ea        SLR          Glute bridge 3x12 3\" 3x12 3\" SL 3x10 ea    3x10 3\" 3x10 3\"   Leg press          Resisted side step          TB resisted thoracic rotation          Walking lunge          Scorpions           Piriformis stretch w/ rotation          Hip IR in quadriped          Elevated heel squat                    Deep squat hold          90-90 hip IR          Ther Activity          Treadmill walking     10' speed 3               Gait Training                              Modalities                                   "

## 2025-01-16 ENCOUNTER — TELEPHONE (OUTPATIENT)
Age: 40
End: 2025-01-16

## 2025-01-16 NOTE — TELEPHONE ENCOUNTER
PT CALLED STATING HE HAS BEEN TRYING TO FIND ANOTHER LOCATION THAT CAN SCHEDULE HIS MRI'S FROM LOV 1/9/2025 [facility] SOONER THAN THROUGH [facility].    PT IS GOING TO SEE IF HE CAN SCHEDULE THESE MRI'S AT:    [facility]  [address] [place] [address]  PHONE - [phone]  FAX - [phone]    PT REQUESTS MRI TSPINE ORDER AND MRI CSPINE ORDERS BE FAXED OVER WITH ANY RELEVANT STATE [employer] INS[employer] DETAILS FOR SCHEDULING/AUTH PURPOSES.      THANK YOU

## 2025-01-20 ENCOUNTER — OFFICE VISIT (OUTPATIENT)
Dept: PHYSICAL THERAPY | Facility: CLINIC | Age: 40
End: 2025-01-20
Payer: COMMERCIAL

## 2025-01-20 DIAGNOSIS — M25.512 CHRONIC PAIN OF BOTH SHOULDERS: ICD-10-CM

## 2025-01-20 DIAGNOSIS — G89.29 CHRONIC PAIN OF BOTH SHOULDERS: ICD-10-CM

## 2025-01-20 DIAGNOSIS — M25.511 CHRONIC PAIN OF BOTH SHOULDERS: ICD-10-CM

## 2025-01-20 DIAGNOSIS — M54.50 CHRONIC BILATERAL LOW BACK PAIN, UNSPECIFIED WHETHER SCIATICA PRESENT: Primary | ICD-10-CM

## 2025-01-20 DIAGNOSIS — M79.18 MYOFASCIAL PAIN SYNDROME: ICD-10-CM

## 2025-01-20 DIAGNOSIS — G89.29 CHRONIC BILATERAL LOW BACK PAIN, UNSPECIFIED WHETHER SCIATICA PRESENT: Primary | ICD-10-CM

## 2025-01-20 DIAGNOSIS — M54.2 NECK PAIN: ICD-10-CM

## 2025-01-20 PROCEDURE — 97140 MANUAL THERAPY 1/> REGIONS: CPT

## 2025-01-20 PROCEDURE — 97110 THERAPEUTIC EXERCISES: CPT

## 2025-01-20 PROCEDURE — 97112 NEUROMUSCULAR REEDUCATION: CPT

## 2025-01-20 NOTE — PROGRESS NOTES
Daily Note     Today's date: 2025  Patient name: Pedro Morse  : 1985  MRN: [id number]  Referring provider: Everardo Nguyen MD  Dx:   Encounter Diagnosis     ICD-10-CM    1. Chronic bilateral low back pain, unspecified whether sciatica present  M54.50     G89.29       2. Neck pain  M54.2       3. Chronic pain of both shoulders  M25.511     G89.29     M25.512       4. Myofascial pain syndrome  M79.18         Start Time: 1716  Stop Time: 1801  Total time in clinic (min): 45 minutes    Subjective: Patient reports that his low back is feeling much better compared to previous weeks and that his major concern in his L shoulder blade pain.       Objective: See treatment diary below      Assessment: Tolerated treatment well. Utilized manuals to decrease symptom irritability in L shoulder blade, pt reported decreased pain and tension post therapy. Advanced lumbar extension exercise w/ prone Y cone taps. Continue to challenge Pt and advance functional movement patterns NV. Patient demonstrated fatigue post treatment, exhibited good technique with therapeutic exercises, and would benefit from continued PT for increased mobility, increased strength, and decreased symptom irritability.       Plan: Continue per plan of care.  Progress treatment as tolerated.       Precautions: High symptom irritability     POC expires Unit limit Auth Expiration date PT/OT/ST + Visit Limit?   24 BOMN N/A BOMN                           Visit/Unit Tracking  AUTH Status:  Date 12/3 12/19 1/2 1/7 1/9 1/14 1/20   N/A Used 24 25 26 27 28 29 30    Remaining             HEP: [id number]   Manuals 11/21 12/3 12/19 1/2 1/7 1/9 1/14 1/20   Thoracic G5 JMK JMK/ L rib G 5 mob JMK/ L rib G 5 mob JMK    JMK   Lumbar G5           Thoracic G3-4 JMK   JMK    JMK   Cervical distraction           CTJ G5 JMK          Cervical STM           LAD        JMK   Cupping protocol     JMK 10' JMK 10' JMK 10'    Lumbar gapping and QL release           Lumbar  "manual distraction JMK          SI LAD           Cervical G5 mob JMK          Manual overpressure side glides      JMK      Trap IASTM    JMK low trap       Neuro Re-Ed           Kieser Row 3x12 40# 3x12 40#  3x10 40#    3x12 40#   Kieser Shoulder ext 3x12 16# 3x12 20#  3x10 18#       Robberies    3x10 10#       DNF            Prone scap retraction           Repeated extensions       3x10    Side lying hip abduction W/ plank 2x10 ea W/ plank 2x10 ea W/ plank 2x10 ea        Bird dog 2x10 ea          Dead bug 2x10 ea 2x10 ea         Plank   X2 till failure.         Cervical retraction w/ shoulder shrug           Supine sciatic glide           Lumbar extension off table 1' x4 1\" x3     1\" x3 1\" x4   Bear plank w/ leg lift  3x10 ea 3x10 ea        Lumbar extension w/ Prone Y        2x10 w/ cones   Ther Ex           HEP education   10'  10'      UE bike 5' upright 3'/3' 5' upright  3'/3' 5' upright  5' treadmill 5' treadmill 5' upright   Prone extension static     5'      Lateral glides     R 2x10 5\" R 3x10 5\"     Repeated extensions w/ overpressure      3x10 3x10 in standing    Thoracic extensions           Thoracic circuit    5'    5'   Open books    X20 3\" ea       Snags           Tspine thread the needle  X30 ea         SLR           Glute bridge 3x12 3\" 3x12 3\" SL 3x10 ea    3x10 3\" 3x10 3\" 3x10 3\"   Leg press           Resisted side step           TB resisted thoracic rotation           Walking lunge           Scorpions            Piriformis stretch w/ rotation           Hip IR in quadriped           Elevated heel squat                      Deep squat hold           90-90 hip IR           Ther Activity           Treadmill walking     10' speed 3                 Gait Training                                 Modalities                                      "

## 2025-01-21 ENCOUNTER — TELEPHONE (OUTPATIENT)
Age: 40
End: 2025-01-21

## 2025-01-21 NOTE — TELEPHONE ENCOUNTER
Pt called in looking for a mychart msg. I let him know the message was there and then he read and and said he would call back.

## 2025-01-21 NOTE — TELEPHONE ENCOUNTER
Please have patient complete PT and follow up with MG to discuss symptoms and whether injection is needed. Will also give him time to get the other imaging that was ordered by neurosurgery.

## 2025-01-21 NOTE — TELEPHONE ENCOUNTER
Pt called to advise he was having MRI 1/24/25 and would get disk and reports, pt requested a sooner appt with Dr Gates but none were available, advised pt he is on the waitlist and we would reach out if a cancellation occurs,. Also offered appt with another provider sooner but pt declined.

## 2025-01-21 NOTE — TELEPHONE ENCOUNTER
Carissa  from [facility] called for auto information to authorize his MRI  She has requested fax to , I was unable to send via [employer] can you print this and fax to her please.    State: [place]     Date of Injury: 8/2/23     AUTO CLAIM #: [id number]     : ALISIA HOROWITZ     #: [phone]     Ins. Name: [employer] [facility]     Billing Address: [facility] [id number] [place] [address]     Claim Status: OPEN & BILLABLE      Date of verification: 12/16/24     Verified by: JANINE

## 2025-01-23 ENCOUNTER — APPOINTMENT (OUTPATIENT)
Dept: PHYSICAL THERAPY | Facility: CLINIC | Age: 40
End: 2025-01-23
Payer: COMMERCIAL

## 2025-01-28 ENCOUNTER — OFFICE VISIT (OUTPATIENT)
Dept: PHYSICAL THERAPY | Facility: CLINIC | Age: 40
End: 2025-01-28
Payer: COMMERCIAL

## 2025-01-28 DIAGNOSIS — M54.2 NECK PAIN: ICD-10-CM

## 2025-01-28 DIAGNOSIS — G89.29 CHRONIC BILATERAL LOW BACK PAIN, UNSPECIFIED WHETHER SCIATICA PRESENT: Primary | ICD-10-CM

## 2025-01-28 DIAGNOSIS — M25.511 CHRONIC PAIN OF BOTH SHOULDERS: ICD-10-CM

## 2025-01-28 DIAGNOSIS — M25.512 CHRONIC PAIN OF BOTH SHOULDERS: ICD-10-CM

## 2025-01-28 DIAGNOSIS — M54.50 CHRONIC BILATERAL LOW BACK PAIN, UNSPECIFIED WHETHER SCIATICA PRESENT: Primary | ICD-10-CM

## 2025-01-28 DIAGNOSIS — G89.29 CHRONIC PAIN OF BOTH SHOULDERS: ICD-10-CM

## 2025-01-28 DIAGNOSIS — M79.18 MYOFASCIAL PAIN SYNDROME: ICD-10-CM

## 2025-01-28 PROCEDURE — 97140 MANUAL THERAPY 1/> REGIONS: CPT

## 2025-01-28 PROCEDURE — 97112 NEUROMUSCULAR REEDUCATION: CPT

## 2025-01-28 PROCEDURE — 97110 THERAPEUTIC EXERCISES: CPT

## 2025-01-28 NOTE — PROGRESS NOTES
Daily Note     Today's date: 2025  Patient name: Pedro Morse  : 1985  MRN: [id number]  Referring provider: Everardo Nguyen MD  Dx:   Encounter Diagnosis     ICD-10-CM    1. Chronic bilateral low back pain, unspecified whether sciatica present  M54.50     G89.29       2. Neck pain  M54.2       3. Chronic pain of both shoulders  M25.511     G89.29     M25.512       4. Myofascial pain syndrome  M79.18         Start Time: 1717  Stop Time: 1802  Total time in clinic (min): 45 minutes    Subjective: Patient reports that his low back is feeling better. Pt had imaging of cervical and thoracic spine last week. Pt also followed-up w/ pain management doctor. Pain management requested a re-evaluation of the Pt's hip[as there may be a leg length discrepancy and ROM restrictions.       Objective: See treatment diary below      Assessment: Tolerated treatment well. Pt has full hip ROM in bilateral hips. Pt had pain w/ scour and FADIR in both hips. Pt had minor leg length discrepancy w/ LLE being minimally shorter than R, no changes following LAD for correction. Reviewed imaging w/ pT and educated Pt on future POC for PT. Pt has follow-up w/ pain management next week to discuss future steps. Patient demonstrated fatigue post treatment, exhibited good technique with therapeutic exercises, and would benefit from continued PT for increased mobility, increased strength, and decreased symptom irritability.       Plan: Continue per plan of care.  Progress treatment as tolerated.       Precautions: High symptom irritability     POC expires Unit limit Auth Expiration date PT/OT/ST + Visit Limit?   24 BOMN N/A BOMN                           Visit/Unit Tracking  AUTH Status:  Date 12/3 12/19 1/2 1/7 1/9 1/14 1/20 1/28   N/A Used 24 25 26 27 28 29 30 31    Remaining              HEP: [id number]   Manuals 11/21 12/3 12/19 1/2 1/7 1/9 1/14 1/20 1/28   Thoracic G5 JMK JMK/ L rib G 5 mob JMK/ L rib G 5 mob JMK    JMK    Lumbar  "G5            Thoracic G3-4 JMK   JMK    JMK    Cervical distraction            CTJ G5 JMK           Cervical STM            LAD        JMK JMK   Cupping protocol     JMK 10' JMK 10' JMK 10'     Lumbar gapping and QL release            Lumbar manual distraction JMK           SI LAD            Cervical G5 mob JMK           Manual overpressure side glides      JMK       Trap IASTM    JMK low trap        Hip re-evaluation         JMK 10'   Neuro Re-Ed            Kieser Row 3x12 40# 3x12 40#  3x10 40#    3x12 40#    Kieser Shoulder ext 3x12 16# 3x12 20#  3x10 18#        Robberies    3x10 10#        DNF             Prone scap retraction            Repeated extensions       3x10     Side lying hip abduction W/ plank 2x10 ea W/ plank 2x10 ea W/ plank 2x10 ea         Bird dog 2x10 ea           Dead bug 2x10 ea 2x10 ea          Plank   X2 till failure.          Cervical retraction w/ shoulder shrug            Supine sciatic glide            Lumbar extension off table 1' x4 1\" x3     1\" x3 1\" x4 1\" x4   Bear plank w/ leg lift  3x10 ea 3x10 ea         Lumbar extension w/ Prone Y        2x10 w/ cones 2x10 w/ cones   Ther Ex            HEP education   10'  10'       UE bike 5' upright 3'/3' 5' upright  3'/3' 5' upright  5' treadmill 5' treadmill 5' upright 6' treadmill   Prone extension static     5'       Lateral glides     R 2x10 5\" R 3x10 5\"      Repeated extensions w/ overpressure      3x10 3x10 in standing     Thoracic extensions            Thoracic circuit    5'    5'    Open books    X20 3\" ea        Snags            Tspine thread the needle  X30 ea          SLR            Glute bridge 3x12 3\" 3x12 3\" SL 3x10 ea    3x10 3\" 3x10 3\" 3x10 3\"    Leg press            Resisted side step         [place] 30# x10 ea   TB resisted thoracic rotation            Walking lunge            Scorpions             Piriformis stretch w/ rotation            Hip IR in quadriped            Elevated heel squat                        Deep squat " hold            90-90 hip IR            Ther Activity            Treadmill walking     10' speed 3                   Gait Training                                    Modalities

## 2025-01-30 ENCOUNTER — OFFICE VISIT (OUTPATIENT)
Dept: PHYSICAL THERAPY | Facility: CLINIC | Age: 40
End: 2025-01-30
Payer: COMMERCIAL

## 2025-01-30 DIAGNOSIS — G89.29 CHRONIC PAIN OF BOTH SHOULDERS: ICD-10-CM

## 2025-01-30 DIAGNOSIS — M25.511 CHRONIC PAIN OF BOTH SHOULDERS: ICD-10-CM

## 2025-01-30 DIAGNOSIS — M54.50 CHRONIC BILATERAL LOW BACK PAIN, UNSPECIFIED WHETHER SCIATICA PRESENT: Primary | ICD-10-CM

## 2025-01-30 DIAGNOSIS — G89.29 CHRONIC BILATERAL LOW BACK PAIN, UNSPECIFIED WHETHER SCIATICA PRESENT: Primary | ICD-10-CM

## 2025-01-30 DIAGNOSIS — M25.512 CHRONIC PAIN OF BOTH SHOULDERS: ICD-10-CM

## 2025-01-30 DIAGNOSIS — M54.2 NECK PAIN: ICD-10-CM

## 2025-01-30 DIAGNOSIS — M79.18 MYOFASCIAL PAIN SYNDROME: ICD-10-CM

## 2025-01-30 PROCEDURE — 97140 MANUAL THERAPY 1/> REGIONS: CPT

## 2025-01-30 PROCEDURE — 97112 NEUROMUSCULAR REEDUCATION: CPT

## 2025-01-30 PROCEDURE — 97110 THERAPEUTIC EXERCISES: CPT

## 2025-01-30 NOTE — PROGRESS NOTES
Daily Note     Today's date: 2025  Patient name: Pedro Morse  : 1985  MRN: [id number]  Referring provider: Everardo Nguyen MD  Dx:   Encounter Diagnosis     ICD-10-CM    1. Chronic bilateral low back pain, unspecified whether sciatica present  M54.50     G89.29       2. Neck pain  M54.2       3. Chronic pain of both shoulders  M25.511     G89.29     M25.512       4. Myofascial pain syndrome  M79.18         Start Time: 1147  Stop Time: 1226  Total time in clinic (min): 39 minutes    Subjective: Patient reports that his low back is bothersome today, reports higher symptom irritability and sensitivity. Pt reports no changes in the thoracic spine.        Objective: See treatment diary below      Assessment: Tolerated treatment well. No major changes in symptoms following manual therapy. Pt still challenged by exercise prescription. Patient demonstrated fatigue post treatment, exhibited good technique with therapeutic exercises, and would benefit from continued PT for increased mobility, increased strength, and decreased symptom irritability.       Plan: Continue per plan of care.  Progress treatment as tolerated.       Precautions: High symptom irritability     POC expires Unit limit Auth Expiration date PT/OT/ST + Visit Limit?   24 BOMN N/A BOMN                           Visit/Unit Tracking  AUTH Status:  Date 12/3 12/19 1/2 1/7 1/9 1/14 1/20 1/28 1/30   N/A Used 24 25 26 27 28 29 30 31 32    Remaining               HEP: [id number]   Manuals 11/21 12/3 12/19 1/2 1/7 1/9 1/14 1/20 1/28 1/30   Thoracic G5 JMK JMK/ L rib G 5 mob JMK/ L rib G 5 mob JMK    JMK  JMK   Lumbar G5             Thoracic G3-4 JMK   JMK    JMK  JMK   Cervical distraction             CTJ G5 JMK            Cervical STM             LAD        JMK JMK    Cupping protocol     JMK 10' JMK 10' JMK 10'      Lumbar gapping and QL release             Lumbar manual distraction JMK            SI LAD             Cervical G5 mob JMK           "  Manual overpressure side glides      JMK        Trap IASTM    JMK low trap      JMK   Hip re-evaluation         JMK 10'    Neuro Re-Ed             Kieser Row 3x12 40# 3x12 40#  3x10 40#    3x12 40#     Kieser Shoulder ext 3x12 16# 3x12 20#  3x10 18#         Robberies    3x10 10#         DNF              Prone scap retraction             Repeated extensions       3x10      Side lying hip abduction W/ plank 2x10 ea W/ plank 2x10 ea W/ plank 2x10 ea          Bird dog 2x10 ea            Dead bug 2x10 ea 2x10 ea           Plank   X2 till failure.           Cervical retraction w/ shoulder shrug             Supine sciatic glide             Lumbar extension off table 1' x4 1\" x3     1\" x3 1\" x4 1\" x4 1\" x4   Bear plank w/ leg lift  3x10 ea 3x10 ea          Lumbar extension w/ Prone Y        2x10 w/ cones 2x10 w/ cones 2x10 w/ cones   Ther Ex             HEP education   10'  10'        UE bike 5' upright 3'/3' 5' upright  3'/3' 5' upright  5' treadmill 5' treadmill 5' upright 6' treadmill 5' upright   Prone extension static     5'        Lateral glides     R 2x10 5\" R 3x10 5\"       Repeated extensions w/ overpressure      3x10 3x10 in standing      Thoracic extensions             Thoracic circuit    5'    5'  5'   Open books    X20 3\" ea         Snags             Tspine thread the needle  X30 ea           SLR             Glute bridge 3x12 3\" 3x12 3\" SL 3x10 ea    3x10 3\" 3x10 3\" 3x10 3\"  3x10 3\"   Leg press             Resisted side step         [place] 30# x10 ea    TB resisted thoracic rotation             Walking lunge             Scorpions              Piriformis stretch w/ rotation             Hip IR in quadriped             Elevated heel squat                          Deep squat hold             90-90 hip IR             Ther Activity             Treadmill walking     10' speed 3                     Gait Training                                       Modalities                                            "

## 2025-02-11 ENCOUNTER — OFFICE VISIT (OUTPATIENT)
Dept: PHYSICAL THERAPY | Facility: CLINIC | Age: 40
End: 2025-02-11
Payer: COMMERCIAL

## 2025-02-11 DIAGNOSIS — M25.512 CHRONIC PAIN OF BOTH SHOULDERS: ICD-10-CM

## 2025-02-11 DIAGNOSIS — G89.29 CHRONIC BILATERAL LOW BACK PAIN, UNSPECIFIED WHETHER SCIATICA PRESENT: Primary | ICD-10-CM

## 2025-02-11 DIAGNOSIS — M54.50 CHRONIC BILATERAL LOW BACK PAIN, UNSPECIFIED WHETHER SCIATICA PRESENT: Primary | ICD-10-CM

## 2025-02-11 DIAGNOSIS — G89.29 CHRONIC PAIN OF BOTH SHOULDERS: ICD-10-CM

## 2025-02-11 DIAGNOSIS — M25.511 CHRONIC PAIN OF BOTH SHOULDERS: ICD-10-CM

## 2025-02-11 DIAGNOSIS — M54.2 NECK PAIN: ICD-10-CM

## 2025-02-11 DIAGNOSIS — M79.18 MYOFASCIAL PAIN SYNDROME: ICD-10-CM

## 2025-02-11 PROCEDURE — 97112 NEUROMUSCULAR REEDUCATION: CPT

## 2025-02-11 PROCEDURE — 97110 THERAPEUTIC EXERCISES: CPT

## 2025-02-11 NOTE — PROGRESS NOTES
Daily Note     Today's date: 2025  Patient name: Pedro Morse  : 1985  MRN: [id number]  Referring provider: Everardo Nguyen MD  Dx:   Encounter Diagnosis     ICD-10-CM    1. Chronic bilateral low back pain, unspecified whether sciatica present  M54.50     G89.29       2. Neck pain  M54.2       3. Chronic pain of both shoulders  M25.511     G89.29     M25.512       4. Myofascial pain syndrome  M79.18         Start Time: 1715  Stop Time: 1757  Total time in clinic (min): 42 minutes    Subjective: Patient reports that his neck has been bothersome when rotating side to side. Pt still experiencing lower scapular pain on the L. Lumbar pain remain the same. Pt had follow-up w/ pain management and has another appointment w/ neurosurgery this Thursday.         Objective: See treatment diary below      Assessment: Tolerated treatment well. Pt had palpable pain in Thoracic spine at T9-11 today, avoid manuals in area. Did not perform any high grade thrusting techniques due to high pian in Thoracic spine and current process of determining future POC. Added snags to program to help decrease tension in cervical spine while performing cervical rotation. Snags did not decrease tension, cervical retraction w/ extension was successful in decreasing tension. Educated pt on future POC and instructed Pt to stop all home exercises over the next week to establish new baseline of symptoms. Patient demonstrated fatigue post treatment, exhibited good technique with therapeutic exercises, and would benefit from continued PT for increased mobility, increased strength, and decreased symptom irritability.       Plan: Continue per plan of care.  Progress treatment as tolerated.       Precautions: High symptom irritability     POC expires Unit limit Auth Expiration date PT/OT/ST + Visit Limit?   24 BOMN N/A BOMN                           Visit/Unit Tracking  AUTH Status:  Date 12/3 12/19 1/2 1/7 1/9 1/14 1/20 1/28 1/30 2/11  "  N/A Used 24 25 26 27 28 29 30 31 32 33    Remaining                HEP: [id number]   Manuals 11/21 12/3 12/19 1/2 1/7 1/9 1/14 1/20 1/28 1/30 2/11   Thoracic G5 JMK JMK/ L rib G 5 mob JMK/ L rib G 5 mob JMK    JMK  JMK    Lumbar G5              Thoracic G3-4 JMK   JMK    JMK  JMK JMK G2-3   Cervical distraction              CTJ G5 JMK             Cervical STM              LAD        JMK JMK     Cupping protocol     JMK 10' JMK 10' JMK 10'       Lumbar gapping and QL release              Lumbar manual distraction JMK             SI LAD              Cervical G5 mob JMK             Manual overpressure side glides      JMK         Trap IASTM    JMK low trap      JMK    Hip re-evaluation         JMK 10'     Neuro Re-Ed              Kieser Row 3x12 40# 3x12 40#  3x10 40#    3x12 40#   3x12 40#   Kieser Shoulder ext 3x12 16# 3x12 20#  3x10 18#       3x12 18#   Robberies    3x10 10#       3x12 12#   DNF               Prone scap retraction              Repeated extensions       3x10    Cervical extension w/ retraction x15   Side lying hip abduction W/ plank 2x10 ea W/ plank 2x10 ea W/ plank 2x10 ea           Bird dog 2x10 ea             Dead bug 2x10 ea 2x10 ea            Plank   X2 till failure.            Cervical retraction w/ shoulder shrug              Supine sciatic glide              Lumbar extension off table 1' x4 1\" x3     1\" x3 1\" x4 1\" x4 1\" x4    Bear plank w/ leg lift  3x10 ea 3x10 ea           Lumbar extension w/ Prone Y        2x10 w/ cones 2x10 w/ cones 2x10 w/ cones    Ther Ex              HEP education   10'  10'      5'   UE bike 5' upright 3'/3' 5' upright  3'/3' 5' upright  5' treadmill 5' treadmill 5' upright 6' treadmill 5' upright 3'/3'   Prone extension static     5'         Lateral glides     R 2x10 5\" R 3x10 5\"        Repeated extensions w/ overpressure      3x10 3x10 in standing       Thoracic extensions              Thoracic circuit    5'    5'  5' 5'   Open books    X20 3\" ea          Snags     " "         Tspine thread the needle  X30 ea            SLR              Glute bridge 3x12 3\" 3x12 3\" SL 3x10 ea    3x10 3\" 3x10 3\" 3x10 3\"  3x10 3\"    Leg press              Resisted side step         Kristian 30# x10 ea     TB resisted thoracic rotation              Walking lunge              Scorpions               Piriformis stretch w/ rotation              Hip IR in quadriped              Elevated heel squat              Snags           x10 ea   Deep squat hold              90-90 hip IR              Ther Activity              Treadmill walking     10' speed 3                       Gait Training                                          Modalities                                               "

## 2025-02-13 ENCOUNTER — APPOINTMENT (OUTPATIENT)
Dept: PHYSICAL THERAPY | Facility: CLINIC | Age: 40
End: 2025-02-13
Payer: COMMERCIAL

## 2025-02-18 ENCOUNTER — OFFICE VISIT (OUTPATIENT)
Dept: PHYSICAL THERAPY | Facility: CLINIC | Age: 40
End: 2025-02-18
Payer: COMMERCIAL

## 2025-02-18 DIAGNOSIS — M25.512 CHRONIC PAIN OF BOTH SHOULDERS: ICD-10-CM

## 2025-02-18 DIAGNOSIS — M79.18 MYOFASCIAL PAIN SYNDROME: ICD-10-CM

## 2025-02-18 DIAGNOSIS — M54.50 CHRONIC BILATERAL LOW BACK PAIN, UNSPECIFIED WHETHER SCIATICA PRESENT: Primary | ICD-10-CM

## 2025-02-18 DIAGNOSIS — G89.29 CHRONIC PAIN OF BOTH SHOULDERS: ICD-10-CM

## 2025-02-18 DIAGNOSIS — G89.29 CHRONIC BILATERAL LOW BACK PAIN, UNSPECIFIED WHETHER SCIATICA PRESENT: Primary | ICD-10-CM

## 2025-02-18 DIAGNOSIS — M54.2 NECK PAIN: ICD-10-CM

## 2025-02-18 DIAGNOSIS — M25.511 CHRONIC PAIN OF BOTH SHOULDERS: ICD-10-CM

## 2025-02-18 PROCEDURE — 97110 THERAPEUTIC EXERCISES: CPT

## 2025-02-18 PROCEDURE — 97530 THERAPEUTIC ACTIVITIES: CPT

## 2025-02-18 NOTE — PROGRESS NOTES
Daily Note     Today's date: 2025  Patient name: Pedro Morse  : 1985  MRN: [id number]  Referring provider: Everardo Nguyen MD  Dx:   Encounter Diagnosis     ICD-10-CM    1. Chronic bilateral low back pain, unspecified whether sciatica present  M54.50     G89.29       2. Neck pain  M54.2       3. Chronic pain of both shoulders  M25.511     G89.29     M25.512       4. Myofascial pain syndrome  M79.18         Start Time: 1720  Stop Time: 1758  Total time in clinic (min): 38 minutes    Subjective: Pt reports that he went to get a second opinion, but they did not receive the copy of the imaging so there was no conclusive opinion on what his next steps should be. Pt is traveling next week so his next follow-up wont be until March.       Objective: See treatment diary below      Assessment: Tolerated treatment well. Focused on functional training patterns for a full body workout. Pt had the most trouble w/  on front squat as it bothered his wrists, adjusted to cross body  style. Pt was challenged by SL RDL. No reports of increased back pain throughout today's session. Patient demonstrated fatigue post treatment, exhibited good technique with therapeutic exercises, and would benefit from continued PT for increased stability and decreased symptom irritability.       Plan: Continue per plan of care.      Precautions: High symptom irritability     POC expires Unit limit Auth Expiration date PT/OT/ST + Visit Limit?   24 BOMN N/A BOMN                           Visit/Unit Tracking  AUTH Status:  Date 12/3 12/19 1/2 1/7 1/9 1/14 1/20 1/28 1/30 2/11 2/18   N/A Used 24 25 26 27 28 29 30 31 32 33 34    Remaining                 HEP: [id number]   Manuals 11/21 12/3 12/19 1/2 1/7 1/9 1/14 1/20 1/28 1/30 2/11 2/18   Thoracic G5 JMK JMK/ L rib G 5 mob JMK/ L rib G 5 mob JMK    JMK  JMK     Lumbar G5               Thoracic G3-4 JMK   JMK    JMK  JMK JMK G2-3    Cervical distraction               CTJ G5 JMK       "        Cervical STM               LAD        JMK JMK      Cupping protocol     JMK 10' JMK 10' JMK 10'        Lumbar gapping and QL release               Lumbar manual distraction JMK              SI LAD               Cervical G5 mob JMK              Manual overpressure side glides      JMK          Trap IASTM    JMK low trap      JMK     Hip re-evaluation         JMK 10'      Neuro Re-Ed               Kieser Row 3x12 40# 3x12 40#  3x10 40#    3x12 40#   3x12 40#    Kieser Shoulder ext 3x12 16# 3x12 20#  3x10 18#       3x12 18#    Robberies    3x10 10#       3x12 12#    DNF                Prone scap retraction               Repeated extensions       3x10    Cervical extension w/ retraction x15    Side lying hip abduction W/ plank 2x10 ea W/ plank 2x10 ea W/ plank 2x10 ea            Bird dog 2x10 ea              Dead bug 2x10 ea 2x10 ea             Plank   X2 till failure.             Cervical retraction w/ shoulder shrug               Supine sciatic glide               Lumbar extension off table 1' x4 1\" x3     1\" x3 1\" x4 1\" x4 1\" x4     Bear plank w/ leg lift  3x10 ea 3x10 ea            Lumbar extension w/ Prone Y        2x10 w/ cones 2x10 w/ cones 2x10 w/ cones     Ther Ex               HEP education   10'  10'      5'    Upright bike            5'   UE bike 5' upright 3'/3' 5' upright  3'/3' 5' upright  5' treadmill 5' treadmill 5' upright 6' treadmill 5' upright 3'/3'    Prone extension static     5'          Lateral glides     R 2x10 5\" R 3x10 5\"         Repeated extensions w/ overpressure      3x10 3x10 in standing        Thoracic extensions               Thoracic circuit    5'    5'  5' 5'    Open books    X20 3\" ea           Snags               Tspine thread the needle  X30 ea             SLR               Glute bridge 3x12 3\" 3x12 3\" SL 3x10 ea    3x10 3\" 3x10 3\" 3x10 3\"  3x10 3\"     Leg press               Resisted side step         [place] 30# x10 ea      TB resisted thoracic rotation             " "  Walking lunge               Scorpions                Piriformis stretch w/ rotation               Hip IR in quadriped               Elevated heel squat               Snags           x10 ea    Deep squat hold               90-90 hip IR               Front Squat barbell            3x10 135#   Ther Activity               Treadmill walking     10' speed 3          Box step ups 20\"            2x12 ea   SL RDL            3x8 25# ea   BOSU plank w/ ball            1' x4   Gait Training                                             Modalities                                                    "

## 2025-02-20 ENCOUNTER — OFFICE VISIT (OUTPATIENT)
Dept: PHYSICAL THERAPY | Facility: CLINIC | Age: 40
End: 2025-02-20
Payer: COMMERCIAL

## 2025-02-20 DIAGNOSIS — G89.29 CHRONIC PAIN OF BOTH SHOULDERS: ICD-10-CM

## 2025-02-20 DIAGNOSIS — M25.511 CHRONIC PAIN OF BOTH SHOULDERS: ICD-10-CM

## 2025-02-20 DIAGNOSIS — M54.50 CHRONIC BILATERAL LOW BACK PAIN, UNSPECIFIED WHETHER SCIATICA PRESENT: Primary | ICD-10-CM

## 2025-02-20 DIAGNOSIS — M25.512 CHRONIC PAIN OF BOTH SHOULDERS: ICD-10-CM

## 2025-02-20 DIAGNOSIS — G89.29 CHRONIC BILATERAL LOW BACK PAIN, UNSPECIFIED WHETHER SCIATICA PRESENT: Primary | ICD-10-CM

## 2025-02-20 DIAGNOSIS — M79.18 MYOFASCIAL PAIN SYNDROME: ICD-10-CM

## 2025-02-20 DIAGNOSIS — M54.2 NECK PAIN: ICD-10-CM

## 2025-02-20 PROCEDURE — 97530 THERAPEUTIC ACTIVITIES: CPT

## 2025-02-20 PROCEDURE — 97110 THERAPEUTIC EXERCISES: CPT

## 2025-02-20 NOTE — PROGRESS NOTES
Daily Note     Today's date: 2025  Patient name: Pedro Morse  : 1985  MRN: [id number]  Referring provider: Everardo Nguyen MD  Dx:   Encounter Diagnosis     ICD-10-CM    1. Chronic bilateral low back pain, unspecified whether sciatica present  M54.50     G89.29       2. Neck pain  M54.2       3. Chronic pain of both shoulders  M25.511     G89.29     M25.512       4. Myofascial pain syndrome  M79.18         Start Time: 1149  Stop Time: 1227  Total time in clinic (min): 38 minutes    Subjective: Pt reports that his legs are very sore following last session , especially his hamstrings.       Objective: See treatment diary below      Assessment: Tolerated treatment well. Added functional strengthen training for upper extremity to program today as well. Pt was able to complete pull-ups w/o symptoms irritability, but reported shoulder pain in L shoulder following inverted Rowing. At onset of shoulder pain stopped completing inverted rows and returned to LE exercises. Patient demonstrated fatigue post treatment, exhibited good technique with therapeutic exercises, and would benefit from continued PT for transition into individualized HEP and gym program and for decreased symptom irritability.       Plan: Continue per plan of care.      Precautions: High symptom irritability     POC expires Unit limit Auth Expiration date PT/OT/ST + Visit Limit?   24 BOMN N/A BOMN                           Visit/Unit Tracking  AUTH Status:  Date 1/14 1/20 1/28 1/30 2/11 2/18 2/20   N/A Used 29 30 31 32 33 34 35    Remaining             HEP: [id number]   Manuals    Thoracic G5    JMK  JMK      Lumbar G5            Thoracic G3-4    JMK  JMK JMK G2-3     Cervical distraction            CTJ G5            Cervical STM            LAD    JMK JMK       Cupping protocol JMK 10' JMK 10' JMK 10'         Lumbar gapping and QL release            Lumbar manual distraction            SI LAD      "       Cervical G5 mob            Manual overpressure side glides  JMK           Trap IASTM      JMK      Hip re-evaluation     JMK 10'       Neuro Re-Ed            Kieser Row    3x12 40#   3x12 40#     Kieser Shoulder ext       3x12 18#     Robberies       3x12 12#     DNF             Prone scap retraction            Repeated extensions   3x10    Cervical extension w/ retraction x15     Side lying hip abduction            Bird dog            Dead bug            Plank            Cervical retraction w/ shoulder shrug            Supine sciatic glide            Lumbar extension off table   1\" x3 1\" x4 1\" x4 1\" x4      Bear plank w/ leg lift            Lumbar extension w/ Prone Y    2x10 w/ cones 2x10 w/ cones 2x10 w/ cones      Ther Ex            HEP education 10'      5'     Upright bike        5' 5'   UE bike 5' upright  5' treadmill 5' treadmill 5' upright 6' treadmill 5' upright 3'/3'     Prone extension static 5'           Lateral glides R 2x10 5\" R 3x10 5\"          Repeated extensions w/ overpressure  3x10 3x10 in standing         Thoracic extensions            Thoracic circuit    5'  5' 5'     Open books            Snags            Tspine thread the needle            SLR            Glute bridge  3x10 3\" 3x10 3\" 3x10 3\"  3x10 3\"      Leg press            Resisted side step     [place] 30# x10 ea       TB resisted thoracic rotation            Walking lunge            Scorpions             Piriformis stretch w/ rotation            Hip IR in quadriped            Elevated heel squat            Snags       x10 ea     Deep squat hold            90-90 hip IR            Front Squat barbell        3x10 135#    Austrian split squat         2x12 ea   Pull-ups         4x5   Inverted Row w/ barebell         3x12 3\"   Ther Activity            Treadmill walking 10' speed 3           Box step ups 20\"        2x12 ea 2x12 ea   SL squat to box         2x12 ea   SL RDL        3x8 25# ea    BOSU plank w/ ball        1' x4    Gait " Training                                    Modalities

## 2025-03-06 ENCOUNTER — TELEPHONE (OUTPATIENT)
Dept: NEUROSURGERY | Facility: CLINIC | Age: 40
End: 2025-03-06

## 2025-03-06 ENCOUNTER — TELEPHONE (OUTPATIENT)
Age: 40
End: 2025-03-06

## 2025-03-06 NOTE — TELEPHONE ENCOUNTER
Jina from [facility] called to supply code to open pt disc-(  [id number] ) or information to [employer].   She was unable to locate [place] to [employer] so if if we fax a request to  she will mail new disk, her phone number is  option 2.

## 2025-03-07 NOTE — TELEPHONE ENCOUNTER
"3/7/25 - RCV'D 1/24/25 MRI CSPINE & MRI THORACIC SPINE REPORT.   PER FAX FACESHEET SENT BY LUIS MIGUEL CHRISTIE, \"AS REQUESTED, DISC HAS BEEN MAILED OUT  UPS TRACKING #: [id number]\"  "

## 2025-03-10 ENCOUNTER — TELEPHONE (OUTPATIENT)
Dept: NEUROSURGERY | Facility: CLINIC | Age: 40
End: 2025-03-10

## 2025-03-10 NOTE — TELEPHONE ENCOUNTER
2 Discs and 1 set of reports of MRI Thoracic and Cervical spine from [facility] imaging was received in the [place] office. Discs and reports were given to Chelsey

## 2025-03-13 ENCOUNTER — TELEPHONE (OUTPATIENT)
Dept: NEUROSURGERY | Facility: CLINIC | Age: 40
End: 2025-03-13

## 2025-03-13 ENCOUNTER — OFFICE VISIT (OUTPATIENT)
Dept: NEUROSURGERY | Facility: CLINIC | Age: 40
End: 2025-03-13
Payer: COMMERCIAL

## 2025-03-13 VITALS
TEMPERATURE: 97.3 F | HEART RATE: 72 BPM | DIASTOLIC BLOOD PRESSURE: 82 MMHG | HEIGHT: 65 IN | WEIGHT: 165 LBS | SYSTOLIC BLOOD PRESSURE: 120 MMHG | BODY MASS INDEX: 27.49 KG/M2 | OXYGEN SATURATION: 96 %

## 2025-03-13 DIAGNOSIS — M47.812 CERVICAL SPONDYLOSIS: ICD-10-CM

## 2025-03-13 DIAGNOSIS — M54.12 RADICULOPATHY, CERVICAL: ICD-10-CM

## 2025-03-13 DIAGNOSIS — Q06.8 TETHERED CORD (HCC): Primary | ICD-10-CM

## 2025-03-13 DIAGNOSIS — G95.89 INTRADURAL MASS (HCC): ICD-10-CM

## 2025-03-13 PROCEDURE — 99213 OFFICE O/P EST LOW 20 MIN: CPT | Performed by: NEUROLOGICAL SURGERY

## 2025-03-13 RX ORDER — GABAPENTIN 300 MG/1
300 CAPSULE ORAL AS NEEDED
COMMUNITY
Start: 2025-01-23 | End: 2025-03-21

## 2025-03-13 RX ORDER — NAPROXEN 500 MG/1
500 TABLET ORAL AS NEEDED
COMMUNITY
Start: 2025-03-03

## 2025-03-13 NOTE — PROGRESS NOTES
Name: Pedro Morse      : 1985      MRN: [id number]  Encounter Provider: John Kemp MD  Encounter Date: 3/13/2025   Encounter department: [facility]  :  Assessment & Plan  Cervical spondylosis  Cervical spondylosis without spinal stenosis is present at the C3-4 and C4-5 level.  The C3-4 level may be contributing to intermittent headaches.  At the C4-5 level there is compression of the exiting nerve roots causing a C5 radiculopathy bilaterally at the shoulders.  Physical therapy will likely be helpful for this.  I have recommended surgery only as a final alternative for it.       Radiculopathy, cervical  As above.       Tethered cord (HCC)  Physical therapy and epidural steroids for the time being.  He does not have power loss and he has no problems with bowel bladder or sexual function.  These red flag symptoms should precipitated referral back to us at once.       Intradural mass (HCC)  Intradural lipoma.           History of Present Illness     Pedro Morse is a 39 y.o. male who presents as follows:    F/U after MRI and cervcal spine  Pain 3/10 pain for two years  Upper and lower pain - radiates to both legs and stopping at knees  Muscle spasms at shoulder blades and in low back  Tightness in both shoulders  Weaknee in both legs occasionally and tingling in both knees  Poor balance  PT did produce some relief.  Last went in Feb  No injections               Review of Systems   Constitutional: Negative.    HENT: Negative.     Eyes: Negative.    Respiratory: Negative.     Cardiovascular: Negative.    Gastrointestinal: Negative.    Endocrine: Negative.    Genitourinary:  Positive for enuresis.   Musculoskeletal:  Positive for back pain, gait problem and myalgias.        2025: c/o: Upper/Lower back pain radiates to both hips/buttocks/legs stopping at the knees; m/s in back/shoulder blades; off balance; tightness in both shoulders; weakness - b/l legs occ;  "tingling in both knees; pain disrupts sleep; bowel problems. PT: 02/20/2025 - Yes to relief; No recent LEVI. Pain 3/10. Imaging: MRI Thoracic & Cervical w/o con 01/24/2025   Skin: Negative.    Allergic/Immunologic: Negative.    Neurological:  Positive for weakness. Negative for numbness.   Hematological: Negative.    Psychiatric/Behavioral:  Positive for sleep disturbance.      I have personally reviewed the MA's review of systems and made changes as necessary.    Past Medical History   Past Medical History:   Diagnosis Date    Hypothyroid      History reviewed. No pertinent surgical history.  Family History   Problem Relation Age of Onset    Hyperthyroidism Father      he reports that he has quit smoking. His smoking use included cigarettes. He has never used smokeless tobacco. He reports that he does not drink alcohol and does not use drugs.  Current Outpatient Medications   Medication Instructions    EPINEPHrine (EPIPEN JR) 0.15 mg, Intramuscular, Once    gabapentin (NEURONTIN) 300 mg, As needed    levothyroxine 100 mcg, Oral, Daily    methocarbamol (ROBAXIN) 750 mg, Oral, 3 times daily PRN    methylPREDNISolone 4 MG tablet therapy pack Use as directed on package    naproxen (NAPROSYN) 500 mg, As needed    triamcinolone (KENALOG) 0.1 % cream Topical, 2 times daily     Allergies   Allergen Reactions    Lactose - Food Allergy Anaphylaxis    Milk (Cow) Anaphylaxis     Ingredients: milk; Type: Drug;    Type: Food;     Ingredients: milk; Type: Drug;    Type: Food;    Milk-Related Compounds - Food Allergy Anaphylaxis    Nuts - Food Allergy Anaphylaxis    Peanut Oil - Food Allergy Anaphylaxis      Objective   /82 (BP Location: Right arm, Patient Position: Sitting, Cuff Size: Adult)   Pulse 72   Temp (!) 97.3 °F (36.3 °C) (Temporal)   Ht 5' 5\" (1.651 m)   Wt 74.8 kg (165 lb)   SpO2 96%   BMI 27.46 kg/m²     Physical Exam  Vitals and nursing note reviewed.   Constitutional:       General: He is not in acute " distress.     Appearance: Normal appearance. He is normal weight. He is not ill-appearing, toxic-appearing or diaphoretic.   HENT:      Head: Normocephalic and atraumatic.      Nose: Nose normal.   Eyes:      Extraocular Movements: Extraocular movements intact.      Pupils: Pupils are equal, round, and reactive to light.   Musculoskeletal:         General: No swelling, tenderness, deformity or signs of injury. Normal range of motion.      Cervical back: Normal range of motion. Rigidity present. No tenderness.      Right lower leg: No edema.      Left lower leg: No edema.   Skin:     General: Skin is warm and dry.   Neurological:      Mental Status: He is alert and oriented to person, place, and time.      Cranial Nerves: No cranial nerve deficit.      Sensory: Sensory deficit (There is reduction in fine touch and pinprick in the C5 distribution in the shoulders bilaterally left worse than right) present.      Motor: Weakness (There is some deltoid weakness bilaterally left worse than right) present.      Coordination: Coordination normal.      Gait: Gait ( ) normal.      Deep Tendon Reflexes: Reflexes normal.   Psychiatric:         Mood and Affect: Mood normal.         Behavior: Behavior normal.         Thought Content: Thought content normal.         Judgment: Judgment normal.       Neurological Exam  Mental Status  Alert. Oriented to person, place, and time.    Cranial Nerves  CN III, IV, VI: Extraocular movements intact bilaterally. Pupils equal round and reactive to light bilaterally.    Gait   Normal gait ( ).      Radiology Results Review: I personally reviewed the following image studies in PACS and associated radiology reports: MRI spine. My interpretation of the radiology images/reports is: MRI of the thoracic spine and MRI of the cervical spine are carefully reviewed.  An MRI of the cervical spine demonstrates some cervical spondylosis.  This is present to a greater extent at L3-4 and L4-5.  At the L4-5  region there are bilateral posterior projecting osteophytes causing compression of the exiting nerve roots.  At the C3-4 region this is also present.  In the thoracic spine there are very small disc bulges none of which cause compression of the exiting nerve roots nor the spinal cord.  There is no syringomyelia..

## 2025-03-13 NOTE — TELEPHONE ENCOUNTER
Pt called to determine if the imaging from outside center had been received as he has appointment for today.  He stated that he had been expecting a call once they were received.      This RN reviewed EMR and was able to let pt know that the MRI cervical spine wo contrast as well as the MRI thoracic spine wo contrast from 1/24/2025 were received.      Pt again noted that he had been expecting a call to let him know and didn't feel like he should have to be calling to verify for himself.     This RN apologized for the pt inconvenience noting that unfortunately there is nothing that prompts us to know when images are pushed.  Again apologizing for any inconvenience but noting that we have the images and they are avail for today 's appointment

## 2025-03-13 NOTE — ASSESSMENT & PLAN NOTE
Physical therapy and epidural steroids for the time being.  He does not have power loss and he has no problems with bowel bladder or sexual function.  These red flag symptoms should precipitated referral back to us at once.

## 2025-03-14 ENCOUNTER — TELEPHONE (OUTPATIENT)
Dept: NEUROSURGERY | Facility: CLINIC | Age: 40
End: 2025-03-14

## 2025-03-14 NOTE — TELEPHONE ENCOUNTER
Left Message - Left pt voicemail about if he wanted to  his discs and/or giving us a call @ [phone]. - MP

## 2025-03-14 NOTE — TELEPHONE ENCOUNTER
Pt returned call and requested disc be mailed to him, address in chart is correct. JT will get disk out to pt.

## 2025-03-21 ENCOUNTER — OFFICE VISIT (OUTPATIENT)
Dept: PAIN MEDICINE | Facility: CLINIC | Age: 40
End: 2025-03-21
Payer: COMMERCIAL

## 2025-03-21 VITALS — BODY MASS INDEX: 28.66 KG/M2 | HEIGHT: 65 IN | WEIGHT: 172 LBS

## 2025-03-21 DIAGNOSIS — M54.12 CERVICAL RADICULOPATHY: Primary | ICD-10-CM

## 2025-03-21 DIAGNOSIS — D17.79 INTRADURAL LIPOMA OF SPINE: ICD-10-CM

## 2025-03-21 DIAGNOSIS — M79.18 MYOFASCIAL PAIN SYNDROME: ICD-10-CM

## 2025-03-21 DIAGNOSIS — M54.16 LUMBAR RADICULOPATHY: ICD-10-CM

## 2025-03-21 PROCEDURE — 99214 OFFICE O/P EST MOD 30 MIN: CPT

## 2025-03-21 NOTE — PROGRESS NOTES
Pain Medicine Follow-Up Note    Assessment:  1. Cervical radiculopathy    2. Lumbar radiculopathy    3. Intradural lipoma of spine    4. Myofascial pain syndrome        Plan:  Orders Placed This Encounter   Procedures   • FL spine and pain procedure     Standing Status:   Future     Expected Date:   3/21/2025     Expiration Date:   3/21/2029     Reason for Exam::   GERMÁN  C7-T1     Anticoagulant hold needed?:   no   • FL spine and pain procedure     Standing Status:   Future     Expected Date:   3/21/2025     Expiration Date:   3/21/2029     Reason for Exam::   Caudal LEVI     Anticoagulant hold needed?:   no       My impressions and treatment recommendations were discussed in detail with the patient who verbalized understanding and had no further questions.      Patient returns to the office after being evaluated by neurosurgery.  Patient's a lumbar MRI showed a large intradural lipoma that extends from L2-L3 to L5-S1.  At this time Dr. Kemp recommends epidural steroid injections and physical therapy.  Patient has been consistently going to physical therapy which she has found beneficial but continues with severe to moderate pain at times.  Due to the nature of the patient's lipoma feel it is best to offer the patient a caudal epidural steroid injection.  The patient wishes to avoid oral medications for he has not found them very helpful in the past.  Patient also has been having persistent neck and bilateral shoulder pain patient's recent cervical MRI showed a asymmetric disc osteophyte complex on the left at C4-C5 with a possible impacting the left C5 nerve, patient may benefit from a cervical epidural steroid injection at C7-T1.  Patient also has trigger points noted throughout his thoracic paraspinal and bilateral rhomboid muscle groups recommend the patient have trigger point injections. Complete risks and benefits including bleeding, infection, tissue reaction, nerve injury and allergic reaction were  discussed. The approach was demonstrated using models and literature was provided. Verbal and written consent was obtained.    Follow-up is planned in 4 weeks after injections time or sooner as warranted.  Discharge instructions were provided. I personally saw and examined the patient and I agree with the above discussed plan of care.    History of Present Illness:    Pedro Morse is a 39 y.o. male who presents to [facility] for interval re-evaluation of the above stated pain complaints. The patient has a past medical and chronic pain history as outlined in the assessment section. He was last seen on 9/19/2024.    At today's visit patient states that their pain symptoms are the same with a pain score of 4/10 on the verbal numeric pain scale.  The patient's pain is worse in the morning, evening, and at night.  The patient's pain is constant and intermittently worse in nature.  And the quality of the patient's pain is described as dull-aching, sharp, pressure-like, and shooting.  The patient's pain is located in the neck, bilateral shoulders, entire back, and anterior thighs.  Patient states the amount of pain relief he is obtaining from his current pain relievers which consist of methocarbamol and naproxen is not enough to make a difference in his life due to it reducing his pain by 0%.  Patient states he does not like to take medications therefore chooses not to use them frequently.    Other than as stated above, the patient denies any interval changes in medications, medical condition, mental condition, symptoms, or allergies since the last office visit.         Review of Systems:    Review of Systems   Respiratory:  Negative for shortness of breath.    Cardiovascular:  Positive for chest pain.   Gastrointestinal:  Negative for constipation, diarrhea, nausea and vomiting.   Musculoskeletal:  Positive for back pain and gait problem. Negative for arthralgias, joint swelling and myalgias.  "  Skin:  Negative for rash.   Neurological:  Positive for weakness. Negative for dizziness and seizures.   All other systems reviewed and are negative.        Past Medical History:   Diagnosis Date   • Hypothyroid        History reviewed. No pertinent surgical history.    Family History   Problem Relation Age of Onset   • Hyperthyroidism Father        Social History     Occupational History   • Not on file   Tobacco Use   • Smoking status: Former     Types: Cigarettes   • Smokeless tobacco: Never   Vaping Use   • Vaping status: Never Used   Substance and Sexual Activity   • Alcohol use: Never   • Drug use: Never   • Sexual activity: Yes         Current Outpatient Medications:   •  levothyroxine 100 mcg tablet, TAKE 1 TABLET BY MOUTH EVERY DAY, Disp: 90 tablet, Rfl: 1  •  naproxen (NAPROSYN) 500 mg tablet, 500 mg if needed, Disp: , Rfl:   •  EPINEPHrine (EPIPEN JR) 0.15 mg/0.3 mL SOAJ, Inject 0.3 mL (0.15 mg total) into a muscle once for 1 dose, Disp: 0.3 mL, Rfl: 0  •  methocarbamol (ROBAXIN) 750 mg tablet, Take 1 tablet (750 mg total) by mouth 3 (three) times a day as needed for muscle spasms (pain) (Patient not taking: Reported on 3/21/2025), Disp: 60 tablet, Rfl: 0  •  triamcinolone (KENALOG) 0.1 % cream, Apply topically 2 (two) times a day (Patient not taking: Reported on 1/9/2025), Disp: 80 g, Rfl: 2    Allergies   Allergen Reactions   • Lactose - Food Allergy Anaphylaxis   • Milk (Cow) Anaphylaxis     Ingredients: milk; Type: Drug;    Type: Food;     Ingredients: milk; Type: Drug;    Type: Food;   • Milk-Related Compounds - Food Allergy Anaphylaxis   • Nuts - Food Allergy Anaphylaxis   • Peanut Oil - Food Allergy Anaphylaxis       Physical Exam:    Ht 5' 5\" (1.651 m)   Wt 78 kg (172 lb)   BMI 28.62 kg/m²     Constitutional:normal, well developed, well nourished, alert, in no distress and non-toxic and no overt pain behavior.  Eyes:anicteric  HEENT:grossly intact  Neck: tenderness to palpation of bilateral " trapezius and rhomboid muscles   Pulmonary:even and unlabored  Cardiovascular:No edema or pitting edema present  Skin:Normal without rashes or lesions and well hydrated  Psychiatric:Mood and affect appropriate  Neurologic:Cranial Nerves II-XII grossly intact  Musculoskeletal:normal gait    Lumbar Spine Exam    Appearance:  Normal lordosis  Palpation/Tenderness:  left lumbar paraspinal tenderness  right lumbar paraspinal tenderness  Special Tests:  Left Straight Leg Test:  positive  Right Straight Leg Test:  negative  Left Med's Maneuver:  negative  Right Med's Maneuver:  negative  Left Pelvic Distraction Test:  negative  Right Pelvic Distraction Test:  negative        Imaging  FL spine and pain procedure    (Results Pending)   FL spine and pain procedure    (Results Pending)         Orders Placed This Encounter   Procedures   • FL spine and pain procedure   • FL spine and pain procedure       This document was created using speech voice recognition software.   Grammatical errors, random word insertions, pronoun errors, and incomplete sentences are an occasional consequence of this system due to software limitations, ambient noise, and hardware issues.   Any formal questions or concerns about content, text, or information contained within the body of this dictation should be directly addressed to the provider for clarification.

## 2025-03-21 NOTE — H&P (VIEW-ONLY)
Pain Medicine Follow-Up Note    Assessment:  1. Cervical radiculopathy    2. Lumbar radiculopathy    3. Intradural lipoma of spine    4. Myofascial pain syndrome        Plan:  Orders Placed This Encounter   Procedures   • FL spine and pain procedure     Standing Status:   Future     Expected Date:   3/21/2025     Expiration Date:   3/21/2029     Reason for Exam::   GERMÁN  C7-T1     Anticoagulant hold needed?:   no   • FL spine and pain procedure     Standing Status:   Future     Expected Date:   3/21/2025     Expiration Date:   3/21/2029     Reason for Exam::   Caudal LEVI     Anticoagulant hold needed?:   no       My impressions and treatment recommendations were discussed in detail with the patient who verbalized understanding and had no further questions.      Patient returns to the office after being evaluated by neurosurgery.  Patient's a lumbar MRI showed a large intradural lipoma that extends from L2-L3 to L5-S1.  At this time Dr. Kemp recommends epidural steroid injections and physical therapy.  Patient has been consistently going to physical therapy which she has found beneficial but continues with severe to moderate pain at times.  Due to the nature of the patient's lipoma feel it is best to offer the patient a caudal epidural steroid injection.  The patient wishes to avoid oral medications for he has not found them very helpful in the past.  Patient also has been having persistent neck and bilateral shoulder pain patient's recent cervical MRI showed a asymmetric disc osteophyte complex on the left at C4-C5 with a possible impacting the left C5 nerve, patient may benefit from a cervical epidural steroid injection at C7-T1.  Patient also has trigger points noted throughout his thoracic paraspinal and bilateral rhomboid muscle groups recommend the patient have trigger point injections. Complete risks and benefits including bleeding, infection, tissue reaction, nerve injury and allergic reaction were  discussed. The approach was demonstrated using models and literature was provided. Verbal and written consent was obtained.    Follow-up is planned in 4 weeks after injections time or sooner as warranted.  Discharge instructions were provided. I personally saw and examined the patient and I agree with the above discussed plan of care.    History of Present Illness:    Pedro Morse is a 39 y.o. male who presents to [facility] for interval re-evaluation of the above stated pain complaints. The patient has a past medical and chronic pain history as outlined in the assessment section. He was last seen on 9/19/2024.    At today's visit patient states that their pain symptoms are the same with a pain score of 4/10 on the verbal numeric pain scale.  The patient's pain is worse in the morning, evening, and at night.  The patient's pain is constant and intermittently worse in nature.  And the quality of the patient's pain is described as dull-aching, sharp, pressure-like, and shooting.  The patient's pain is located in the neck, bilateral shoulders, entire back, and anterior thighs.  Patient states the amount of pain relief he is obtaining from his current pain relievers which consist of methocarbamol and naproxen is not enough to make a difference in his life due to it reducing his pain by 0%.  Patient states he does not like to take medications therefore chooses not to use them frequently.    Other than as stated above, the patient denies any interval changes in medications, medical condition, mental condition, symptoms, or allergies since the last office visit.         Review of Systems:    Review of Systems   Respiratory:  Negative for shortness of breath.    Cardiovascular:  Positive for chest pain.   Gastrointestinal:  Negative for constipation, diarrhea, nausea and vomiting.   Musculoskeletal:  Positive for back pain and gait problem. Negative for arthralgias, joint swelling and myalgias.  "  Skin:  Negative for rash.   Neurological:  Positive for weakness. Negative for dizziness and seizures.   All other systems reviewed and are negative.        Past Medical History:   Diagnosis Date   • Hypothyroid        History reviewed. No pertinent surgical history.    Family History   Problem Relation Age of Onset   • Hyperthyroidism Father        Social History     Occupational History   • Not on file   Tobacco Use   • Smoking status: Former     Types: Cigarettes   • Smokeless tobacco: Never   Vaping Use   • Vaping status: Never Used   Substance and Sexual Activity   • Alcohol use: Never   • Drug use: Never   • Sexual activity: Yes         Current Outpatient Medications:   •  levothyroxine 100 mcg tablet, TAKE 1 TABLET BY MOUTH EVERY DAY, Disp: 90 tablet, Rfl: 1  •  naproxen (NAPROSYN) 500 mg tablet, 500 mg if needed, Disp: , Rfl:   •  EPINEPHrine (EPIPEN JR) 0.15 mg/0.3 mL SOAJ, Inject 0.3 mL (0.15 mg total) into a muscle once for 1 dose, Disp: 0.3 mL, Rfl: 0  •  methocarbamol (ROBAXIN) 750 mg tablet, Take 1 tablet (750 mg total) by mouth 3 (three) times a day as needed for muscle spasms (pain) (Patient not taking: Reported on 3/21/2025), Disp: 60 tablet, Rfl: 0  •  triamcinolone (KENALOG) 0.1 % cream, Apply topically 2 (two) times a day (Patient not taking: Reported on 1/9/2025), Disp: 80 g, Rfl: 2    Allergies   Allergen Reactions   • Lactose - Food Allergy Anaphylaxis   • Milk (Cow) Anaphylaxis     Ingredients: milk; Type: Drug;    Type: Food;     Ingredients: milk; Type: Drug;    Type: Food;   • Milk-Related Compounds - Food Allergy Anaphylaxis   • Nuts - Food Allergy Anaphylaxis   • Peanut Oil - Food Allergy Anaphylaxis       Physical Exam:    Ht 5' 5\" (1.651 m)   Wt 78 kg (172 lb)   BMI 28.62 kg/m²     Constitutional:normal, well developed, well nourished, alert, in no distress and non-toxic and no overt pain behavior.  Eyes:anicteric  HEENT:grossly intact  Neck: tenderness to palpation of bilateral " trapezius and rhomboid muscles   Pulmonary:even and unlabored  Cardiovascular:No edema or pitting edema present  Skin:Normal without rashes or lesions and well hydrated  Psychiatric:Mood and affect appropriate  Neurologic:Cranial Nerves II-XII grossly intact  Musculoskeletal:normal gait    Lumbar Spine Exam    Appearance:  Normal lordosis  Palpation/Tenderness:  left lumbar paraspinal tenderness  right lumbar paraspinal tenderness  Special Tests:  Left Straight Leg Test:  positive  Right Straight Leg Test:  negative  Left Med's Maneuver:  negative  Right Med's Maneuver:  negative  Left Pelvic Distraction Test:  negative  Right Pelvic Distraction Test:  negative        Imaging  FL spine and pain procedure    (Results Pending)   FL spine and pain procedure    (Results Pending)         Orders Placed This Encounter   Procedures   • FL spine and pain procedure   • FL spine and pain procedure       This document was created using speech voice recognition software.   Grammatical errors, random word insertions, pronoun errors, and incomplete sentences are an occasional consequence of this system due to software limitations, ambient noise, and hardware issues.   Any formal questions or concerns about content, text, or information contained within the body of this dictation should be directly addressed to the provider for clarification.

## 2025-03-22 NOTE — PATIENT INSTRUCTIONS
Epidural Steroid Injection, Ambulatory Care   GENERAL INFORMATION:   What do I need to know about an epidural steroid injection?  An epidural steroid injection (LEVI) is a procedure to inject steroid medicine into the epidural space. The epidural space is between your spinal cord and vertebrae. Steroids reduce inflammation and fluid buildup in your spine that may be causing pain. You may be given pain medicine along with the steroids.   How do I prepare for an LEVI?  Your healthcare provider will talk to you about how to prepare for your procedure. He will tell you what medicines to take or not take on the day of your procedure. You may need to stop taking blood thinners or other medicines several days before your procedure. You may need to adjust any diabetes medicine you take on the day of your procedure. Steroid medicine can increase your blood sugar level.   What will happen during an LEVI?   You will be given medicine to numb the procedure area. You will be awake for the procedure, but you will not feel pain. You may also be given medicine to help you relax during the procedure. Contrast liquid will be used to help your healthcare provider see the area better. Tell the healthcare provider if you have ever had an allergic reaction to contrast liquid.    Your healthcare provider may place the needle into your neck area, middle of your back, or tailbone area. He may inject the medicine next to the nerves that are causing your pain. He may instead inject the medicine into a larger area of the epidural space. This helps the medicine spread to more nerves. Your healthcare provider will use a fluoroscope to help guide the needle to the right place. A fluoroscope is a type of x-ray. After the procedure, a bandage will be placed over the injection site to prevent infection.  What are the risks of an LEVI?  You may have temporary or permanent nerve damage or paralysis. You may have bleeding or develop a serious infection,  such as meningitis (swelling of the brain coverings). An abscess may also develop. You may need surgery to fix the abscess. You may have a seizure, anxiety, or trouble sleeping. If you are a man, you may have temporary erectile dysfunction (not able to have an erection).   CARE AGREEMENT:   You have the right to help plan your care. Learn about your health condition and how it may be treated. Discuss treatment options with your caregivers to decide what care you want to receive. You always have the right to refuse treatment. The above information is an  only. It is not intended as medical advice for individual conditions or treatments. Talk to your doctor, nurse or pharmacist before following any medical regimen to see if it is safe and effective for you.  © 2014 [employer] Information is for End User's use only and may not be sold, redistributed or otherwise used for commercial purposes. All illustrations and images included in CareNotes® are the copyrighted property of [employer] Inc[employer] or [employer].

## 2025-03-24 ENCOUNTER — PATIENT MESSAGE (OUTPATIENT)
Dept: PAIN MEDICINE | Facility: CLINIC | Age: 40
End: 2025-03-24

## 2025-03-24 NOTE — PATIENT COMMUNICATION
Pt is scheduled for caudal injection and germán with Dr Nguyen on 4/10/25 and 5/15/25    Pt is not diabetic and reports he does not take prescription blood thinners, aspirin, or NSAIDs -- naproxen is listed on pts med list, however, pt reports he does not use it -- did advise if he does use it, it would need to be held before his GERÁMN    Pt given instructions over phone and via myc message    Have you completed PT/HEP/Chiro in the past 6 months for dedicated area? PT with [facility] Jan/Feb 2025 -- has also tried meds for pain relief  If yes, how long did you complete?  What was the frequency?  Did it provide relief?  If no, reason therapy was not completed?

## 2025-04-10 ENCOUNTER — HOSPITAL ENCOUNTER (OUTPATIENT)
Dept: RADIOLOGY | Facility: CLINIC | Age: 40
End: 2025-04-10
Payer: COMMERCIAL

## 2025-04-10 VITALS
RESPIRATION RATE: 20 BRPM | HEART RATE: 73 BPM | SYSTOLIC BLOOD PRESSURE: 125 MMHG | TEMPERATURE: 96.8 F | OXYGEN SATURATION: 95 % | DIASTOLIC BLOOD PRESSURE: 87 MMHG

## 2025-04-10 DIAGNOSIS — M54.16 LUMBAR RADICULOPATHY: ICD-10-CM

## 2025-04-10 DIAGNOSIS — D17.79 INTRADURAL LIPOMA OF SPINE: ICD-10-CM

## 2025-04-10 RX ORDER — METHYLPREDNISOLONE ACETATE 40 MG/ML
80 INJECTION, SUSPENSION INTRA-ARTICULAR; INTRALESIONAL; INTRAMUSCULAR; PARENTERAL; SOFT TISSUE ONCE
Status: COMPLETED | OUTPATIENT
Start: 2025-04-10 | End: 2025-04-10

## 2025-04-10 RX ADMIN — IOHEXOL 1 ML: 300 INJECTION, SOLUTION INTRAVENOUS at 08:40

## 2025-04-10 RX ADMIN — METHYLPREDNISOLONE ACETATE 80 MG: 40 INJECTION, SUSPENSION INTRA-ARTICULAR; INTRALESIONAL; INTRAMUSCULAR; SOFT TISSUE at 08:41

## 2025-04-10 NOTE — DISCHARGE INSTR - LAB
Epidural Steroid Injection   WHAT YOU NEED TO KNOW:   An epidural steroid injection (LEVI) is a procedure to inject steroid medicine into the epidural space. The epidural space is between your spinal cord and vertebrae. Steroids reduce inflammation and fluid buildup in your spine that may be causing pain. You may be given pain medicine along with the steroids.          ACTIVITY  Do not drive or operate machinery today.  No strenuous activity today - bending, lifting, etc.  You may resume normal activites starting tomorrow - start slowly and as tolerated.  You may shower today, but no tub baths or hot tubs.  You may have numbness for several hours from the local anesthetic. Please use caution and common sense, especially with weight-bearing activities.    CARE OF THE INJECTION SITE  If you have soreness or pain, apply ice to the area today (20 minutes on/20 minutes off).  Starting tomorrow, you may use warm, moist heat or ice if needed.  You may have an increase or change in your discomfort for 36-48 hours after your treatment.  Apply ice and continue with any pain medication you have been prescribed.  Notify the [facility] if you have any of the following: redness, drainage, swelling, headache, stiff neck or fever above 100°F.    SPECIAL INSTRUCTIONS  Our office will contact you in approximately 14 days for a progress report.    MEDICATIONS  Continue to take all routine medications.  Our office may have instructed you to hold some medications.    As no general anesthesia was used in today's procedure, you should not experience any side effects related to anesthesia.     If you are diabetic, the steroids used in today's injection may temporarily increase your blood sugar levels after the first few days after your injection. Please keep a close eye on your sugars and alert the doctor who manages your diabetes if your sugars are significantly high from your baseline or you are symptomatic.     If you have a  problem specifically related to your procedure, please call our office at [phone].  Problems not related to your procedure should be directed to your primary care physician.

## 2025-04-10 NOTE — INTERVAL H&P NOTE
Update: (This section must be completed if the H&P was completed greater than 24 hrs to procedure or admission)    H&P reviewed. After examining the patient, I find no changed to the H&P since it had been written.    Patient re-evaluated. Accept as history and physical.    Everardo Nguyen MD/April 10, 2025/8:29 AM

## 2025-04-25 ENCOUNTER — PROCEDURE VISIT (OUTPATIENT)
Dept: PAIN MEDICINE | Facility: CLINIC | Age: 40
End: 2025-04-25
Payer: COMMERCIAL

## 2025-04-25 VITALS — BODY MASS INDEX: 28.66 KG/M2 | HEIGHT: 65 IN | WEIGHT: 172 LBS

## 2025-04-25 DIAGNOSIS — M79.18 MYOFASCIAL PAIN SYNDROME: Primary | ICD-10-CM

## 2025-04-25 PROCEDURE — 20553 NJX 1/MLT TRIGGER POINTS 3/>: CPT | Performed by: STUDENT IN AN ORGANIZED HEALTH CARE EDUCATION/TRAINING PROGRAM

## 2025-04-25 PROCEDURE — 76942 ECHO GUIDE FOR BIOPSY: CPT | Performed by: STUDENT IN AN ORGANIZED HEALTH CARE EDUCATION/TRAINING PROGRAM

## 2025-04-25 RX ORDER — BUPIVACAINE HYDROCHLORIDE 2.5 MG/ML
7 INJECTION, SOLUTION EPIDURAL; INFILTRATION; INTRACAUDAL; PERINEURAL ONCE
Status: COMPLETED | OUTPATIENT
Start: 2025-04-25 | End: 2025-04-25

## 2025-04-25 RX ORDER — GABAPENTIN 300 MG/1
300 CAPSULE ORAL
COMMUNITY
Start: 2025-03-27

## 2025-04-25 RX ORDER — METHYLPREDNISOLONE ACETATE 40 MG/ML
40 INJECTION, SUSPENSION INTRA-ARTICULAR; INTRALESIONAL; INTRAMUSCULAR; SOFT TISSUE ONCE
Status: COMPLETED | OUTPATIENT
Start: 2025-04-25 | End: 2025-04-25

## 2025-04-25 RX ADMIN — BUPIVACAINE HYDROCHLORIDE 7 ML: 2.5 INJECTION, SOLUTION EPIDURAL; INFILTRATION; INTRACAUDAL; PERINEURAL at 13:25

## 2025-04-25 RX ADMIN — METHYLPREDNISOLONE ACETATE 40 MG: 40 INJECTION, SUSPENSION INTRA-ARTICULAR; INTRALESIONAL; INTRAMUSCULAR; SOFT TISSUE at 13:26

## 2025-04-25 NOTE — H&P (VIEW-ONLY)
" Universal Protocol:  procedure performed by consultantConsent: Verbal consent obtained. Written consent obtained.  Risks and benefits: risks, benefits and alternatives were discussed  Consent given by: patient  Time out: Immediately prior to procedure a \"time out\" was called to verify the correct patient, procedure, equipment, support staff and site/side marked as required.  Timeout called at: 4/25/2025 1:00 PM.  Patient understanding: patient states understanding of the procedure being performed  Patient consent: the patient's understanding of the procedure matches consent given  Procedure consent: procedure consent matches procedure scheduled  Relevant documents: relevant documents present and verified  Test results: test results available and properly labeled  Site marked: the operative site was marked  Radiology Images displayed and confirmed. If images not available, report reviewed: imaging studies available  Required items: required blood products, implants, devices, and special equipment available  Patient identity confirmed: verbally with patient  Supporting Documentation  Indications: pain    Injection site identified by: ultrasound  Procedure Details  Location(s):  Additional procedure details: PROCEDURE NOTE    PATIENT NAME:  Pedro Morse    MEDICAL RECORD NUMBER:  [id number]    YOB: 1985    DATE OF PROCEDURE:  04/25/25    PROCEDURE:  Trigger point injection x 8 with local anesthetic and steroid in the bilateral rhomboid, bilateral thoracic parsapinal and bilateral latissimus muscle groups under ultrasound guidance.   ATTENDING PHYSICIAN:  Everardo Nguyen M.D.  PREPROCEDURE DIAGNOSIS:  Myofascial pain with identifiable trigger points.  POSTPROCEDURE DIAGNOSIS:  Myofascial pain with identifiable trigger points.  ANESTHESIA:  Local  ESTIMATED BLOOD LOSS:  Minimal  COMPLICATIONS: None  CONSENT:  Today's procedure, its potential benefits as well as its risks and potential side effects were " reviewed.  Discussed risks of the procedure include bleeding, infection, nerve irritation or damage, reactions to the medications, failure of the pain to improve and exacerbation of the pain were explained to the patient, who verbalized understanding and who wished to proceed.  Informed consent was signed.  DESCRIPTION OF THE PROCEDURE:  After informed consent was obtained, the patient was placed in the seated position. 8 trigger points were identified via palpation and marked with a surgical skin marker.  The skin was prepped with antiseptic in the usual sterile fashion.  Strict aseptic technique was utilized throughout the procedure.  Using ultrasound guidance a 25 gauge, 2inch needle was then advanced into each identified trigger point.  Care was taken to visualize the entirety of the needle throughout the injection. An injectate consisting of 7 ml of 0.25% marcaine with 1 mL of 40mg/mL depo-medrol was slowly injected in divided doses after negative aspiration.  The needle was removed with tip intact.  The patient tolerated the procedure and hemostasis was maintained.  There were no apparent paresthesias or complications.  The skin was wiped clean, and a band-aid was placed as appropriate.  The patient was monitored for an appropriate period of time following the procedure and remained hemodynamically stable and neurovascularly intact.  The patient was ultimately discharged to home with supervision in good condition and instructed to call the office to report the response.

## 2025-04-25 NOTE — PROGRESS NOTES
" Universal Protocol:  procedure performed by consultantConsent: Verbal consent obtained. Written consent obtained.  Risks and benefits: risks, benefits and alternatives were discussed  Consent given by: patient  Time out: Immediately prior to procedure a \"time out\" was called to verify the correct patient, procedure, equipment, support staff and site/side marked as required.  Timeout called at: 4/25/2025 1:00 PM.  Patient understanding: patient states understanding of the procedure being performed  Patient consent: the patient's understanding of the procedure matches consent given  Procedure consent: procedure consent matches procedure scheduled  Relevant documents: relevant documents present and verified  Test results: test results available and properly labeled  Site marked: the operative site was marked  Radiology Images displayed and confirmed. If images not available, report reviewed: imaging studies available  Required items: required blood products, implants, devices, and special equipment available  Patient identity confirmed: verbally with patient  Supporting Documentation  Indications: pain    Injection site identified by: ultrasound  Procedure Details  Location(s):  Additional procedure details: PROCEDURE NOTE    PATIENT NAME:  Pedro Morse    MEDICAL RECORD NUMBER:  [id number]    YOB: 1985    DATE OF PROCEDURE:  04/25/25    PROCEDURE:  Trigger point injection x 8 with local anesthetic and steroid in the bilateral rhomboid, bilateral thoracic parsapinal and bilateral latissimus muscle groups under ultrasound guidance.   ATTENDING PHYSICIAN:  Everardo Nguyen M.D.  PREPROCEDURE DIAGNOSIS:  Myofascial pain with identifiable trigger points.  POSTPROCEDURE DIAGNOSIS:  Myofascial pain with identifiable trigger points.  ANESTHESIA:  Local  ESTIMATED BLOOD LOSS:  Minimal  COMPLICATIONS: None  CONSENT:  Today's procedure, its potential benefits as well as its risks and potential side effects were " reviewed.  Discussed risks of the procedure include bleeding, infection, nerve irritation or damage, reactions to the medications, failure of the pain to improve and exacerbation of the pain were explained to the patient, who verbalized understanding and who wished to proceed.  Informed consent was signed.  DESCRIPTION OF THE PROCEDURE:  After informed consent was obtained, the patient was placed in the seated position. 8 trigger points were identified via palpation and marked with a surgical skin marker.  The skin was prepped with antiseptic in the usual sterile fashion.  Strict aseptic technique was utilized throughout the procedure.  Using ultrasound guidance a 25 gauge, 2inch needle was then advanced into each identified trigger point.  Care was taken to visualize the entirety of the needle throughout the injection. An injectate consisting of 7 ml of 0.25% marcaine with 1 mL of 40mg/mL depo-medrol was slowly injected in divided doses after negative aspiration.  The needle was removed with tip intact.  The patient tolerated the procedure and hemostasis was maintained.  There were no apparent paresthesias or complications.  The skin was wiped clean, and a band-aid was placed as appropriate.  The patient was monitored for an appropriate period of time following the procedure and remained hemodynamically stable and neurovascularly intact.  The patient was ultimately discharged to home with supervision in good condition and instructed to call the office to report the response.           Family

## 2025-04-29 ENCOUNTER — TELEPHONE (OUTPATIENT)
Dept: PAIN MEDICINE | Facility: CLINIC | Age: 40
End: 2025-04-29

## 2025-04-29 NOTE — TELEPHONE ENCOUNTER
Caller: Pedro    Doctor: Dr. Nguyen    Reason for call: for couple of days now he has been getting cramping or stabbing pain mid back near ribs on both side worse left     Call back#: [phone]

## 2025-04-29 NOTE — TELEPHONE ENCOUNTER
S/W pt who had TPI Friday  States Saturday he started with stabbing pain in mid-back on both sides with left greater than right  States this happens more with twisting side to side.  States taking a deep breath doesn't necessarily make it worse, but it doesn't help  Denies trouble with breathing, SOB, etc  Denies fever    Please advise

## 2025-05-06 ENCOUNTER — EVALUATION (OUTPATIENT)
Dept: PHYSICAL THERAPY | Facility: CLINIC | Age: 40
End: 2025-05-06
Payer: COMMERCIAL

## 2025-05-06 DIAGNOSIS — M54.42 CHRONIC BILATERAL LOW BACK PAIN WITH LEFT-SIDED SCIATICA: Primary | ICD-10-CM

## 2025-05-06 DIAGNOSIS — G89.29 CHRONIC BILATERAL THORACIC BACK PAIN: ICD-10-CM

## 2025-05-06 DIAGNOSIS — M54.6 CHRONIC BILATERAL THORACIC BACK PAIN: ICD-10-CM

## 2025-05-06 DIAGNOSIS — G89.29 CHRONIC BILATERAL LOW BACK PAIN WITH LEFT-SIDED SCIATICA: Primary | ICD-10-CM

## 2025-05-06 PROCEDURE — 97164 PT RE-EVAL EST PLAN CARE: CPT

## 2025-05-06 PROCEDURE — 97110 THERAPEUTIC EXERCISES: CPT

## 2025-05-06 NOTE — PROGRESS NOTES
PT Re-Evaluation     Today's date: 2025  Patient name: Pedro Morse  : 1985  MRN: [id number]  Referring provider: Everardo Nguyen MD  Dx:   Encounter Diagnosis     ICD-10-CM    1. Chronic bilateral low back pain with left-sided sciatica  G89.29     M54.42       2. Chronic bilateral thoracic back pain  M54.6     G89.29         Start Time: 1711  Stop Time: 1748  Total time in clinic (min): 37 minutes    Assessment  Impairments: abnormal or restricted ROM, activity intolerance, impaired physical strength, lacks appropriate home exercise program, pain with function, participation limitations, activity limitations and endurance  Symptom irritability: moderate    Assessment details: Patient is a pleasant 39 y.o. male who presents to physical therapy with main reports of chronic mid back and low back pain w/ occasional sciatic symptoms down LLE.    No further referral appears necessary at this time based upon examination results.    Primary movement impairment diagnosis of parascapular and BLE weakness resulting in pathoanatomical symptoms of decreased functional strength and increased symptom irritability. This limits Pedro's ability to perform work related tasks and return to the gym.     Prognosis is good given HEP compliance and PT 1 time per week over the next 12 weeks.  Positive prognostic indicators include positive attitude toward recovery.    Please contact me if you have any questions.  Thank you for the opportunity to share in Pedro Morse care.    Understanding of Dx/Px/POC: good     Prognosis: good    Goals  Establish NV    Plan  Patient would benefit from: skilled physical therapy  Referral necessary: No    Planned therapy interventions: abdominal trunk stabilization, activity modification, IASTM, joint mobilization, manual therapy, massage, nerve gliding, neuromuscular re-education, patient/caregiver education, stretching, therapeutic activities, therapeutic exercise, strengthening, functional  ROM exercises and home exercise program    Frequency: 1x week  Plan of Care beginning date: 2025  Plan of Care expiration date: 2025  Treatment plan discussed with: patient        Subjective Evaluation    History of Present Illness  Mechanism of injury: Pt reports to outpatient PT for re-evaluation following 2 months of no PT. Pt received multiple trigger point injections into the parascapular region and lumbar injections to decrease pain. Pt reports the injections worked well and he has additional ones scheduled next week. Pt wants to return to to PT to work on strength as he feels progressive weakness at work and in daily activities. Pt wants to transition PT into strengthening program at the gym.   Patient Goals  Patient goals for therapy: increased strength  Patient goal: Return to the gym  Pain  Current pain ratin  At best pain ratin  At worst pain ratin          Objective     Postural Observations  Seated posture: good  Standing posture: good      Neurological Testing     Sensation   Cervical/Thoracic   Left   Intact: light touch    Right   Intact: light touch    Lumbar   Left   Intact: light touch    Right   Intact: light touch    Reflexes   Left   Patellar (L4): normal (2+)  Achilles (S1): normal (2+)    Right   Patellar (L4): normal (2+)  Achilles (S1): normal (2+)    Active Range of Motion   Cervical/Thoracic Spine       Cervical  Subcranial protraction:  WFL   Subcranial retraction:  WFL   Flexion:  WFL  Extension:  WFL  Left lateral flexion:  Restriction level: minimal  Right lateral flexion:  Restriction level minimal  Left rotation:  WFL  Right rotation:  WFL    Thoracic    Flexion:  WFL  Extension:  WFL  Left lateral flexion:  WFL  Right lateral flexion:  WFL  Left rotation:  WFL  Right rotation:  WFL    Lumbar   Flexion:  WFL  Extension:  WFL  Left lateral flexion:  WFL  Right lateral flexion:  WFL  Left rotation:  WFL and with pain  Right rotation:  WFL and with pain    Joint  Play   Joints within functional limits: T1, T2, T3, T4, T5, T6, T7, T8, T9, T10, T11 and T12     Strength/Myotome Testing   Cervical Spine   Neck extension: 4  Neck flexion: 4+    Left   Interossei strength (t1): 4  Neck lateral flexion (C3): 4    Right   Interossei strength (t1): 4  Neck lateral flexion (C3): 4    Left Shoulder     Planes of Motion   Flexion: 4+   Extension: 4+   Abduction: 4+   Adduction: 4+   External rotation at 0°: WFL   Internal rotation at 0°: WFL     Isolated Muscles   Lower trapezius: 4   Middle trapezius: 4+   Serratus anterior: 4+   Upper trapezius: 5     Right Shoulder     Planes of Motion   Flexion: 4   Extension: 4+   Abduction: 4+   Adduction: 4+   External rotation at 0°: WFL   Internal rotation at 0°: WFL     Isolated Muscles   Lower trapezius: 4   Middle trapezius: 4+   Serratus anterior: 4+   Upper trapezius: 5     Left Elbow   Flexion: WFL  Extension: WFL    Right Elbow   Flexion: WFL  Extension: WFL    Left Wrist/Hand   Wrist extension: WFL  Wrist flexion: WFL  Radial deviation: WFL  Ulnar deviation: WFL  Thumb extension: WFL    Right Wrist/Hand   Wrist extension: WFL  Wrist flexion: WFL  Radial deviation: WFL  Ulnar deviation: WFL  Thumb extension: WFL    Left Hip   Planes of Motion   Flexion: 4  Extension: 4+  Abduction: 4+  Adduction: 4+  External rotation: WFL  Internal rotation: WFL    Right Hip   Planes of Motion   Flexion: 4  Extension: 4+  Abduction: 4+  Adduction: 4+  External rotation: WFL  Internal rotation: WFL    Left Knee   Flexion: 4  Extension: 4    Right Knee   Flexion: 4  Extension: 4    Left Ankle/Foot   Dorsiflexion: 5  Plantar flexion: 5  Inversion: 5  Eversion: 5  Great toe flexion: 5  Great toe extension: 5    Right Ankle/Foot   Dorsiflexion: 5  Plantar flexion: 5  Inversion: 5  Eversion: 5  Great toe flexion: 5  Great toe extension: 5    Tests     Lumbar     Left   Negative passive SLR and slump test.     Right   Negative passive SLR and slump test.               Precautions: Fracture rib 3/3  POC expires Unit limit Auth Expiration date PT/OT/ST + Visit Limit?   7/29/25 BOMN N/A BOMN                           Visit/Unit Tracking  AUTH Status:  Date 1/14 1/20 1/28 1/30 2/11 2/18 2/20 5/6   N/A Used 29 30 31 32 33 34 35 36    Remaining              HEP: [id number]   Manuals 5/6                                                                Neuro Re-Ed             Hallow holds             Planks             's walk                                                                 Ther Ex             HEP education 8'            Deadlift             Front squat             Pull ups             Bent over row                                                    Ther Activity                                       Gait Training                                       Modalities

## 2025-05-10 DIAGNOSIS — E03.9 HYPOTHYROIDISM, UNSPECIFIED TYPE: ICD-10-CM

## 2025-05-11 RX ORDER — LEVOTHYROXINE SODIUM 100 UG/1
100 TABLET ORAL DAILY
Qty: 30 TABLET | Refills: 0 | Status: SHIPPED | OUTPATIENT
Start: 2025-05-11

## 2025-05-15 ENCOUNTER — TELEPHONE (OUTPATIENT)
Dept: RADIOLOGY | Facility: CLINIC | Age: 40
End: 2025-05-15

## 2025-05-15 ENCOUNTER — HOSPITAL ENCOUNTER (OUTPATIENT)
Dept: RADIOLOGY | Facility: CLINIC | Age: 40
End: 2025-05-15
Payer: COMMERCIAL

## 2025-05-15 VITALS
RESPIRATION RATE: 20 BRPM | DIASTOLIC BLOOD PRESSURE: 85 MMHG | SYSTOLIC BLOOD PRESSURE: 126 MMHG | OXYGEN SATURATION: 96 % | HEART RATE: 73 BPM

## 2025-05-15 DIAGNOSIS — M54.12 CERVICAL RADICULOPATHY: ICD-10-CM

## 2025-05-15 PROCEDURE — 62321 NJX INTERLAMINAR CRV/THRC: CPT | Performed by: STUDENT IN AN ORGANIZED HEALTH CARE EDUCATION/TRAINING PROGRAM

## 2025-05-15 RX ORDER — METHYLPREDNISOLONE ACETATE 80 MG/ML
80 INJECTION, SUSPENSION INTRA-ARTICULAR; INTRALESIONAL; INTRAMUSCULAR; PARENTERAL; SOFT TISSUE ONCE
Status: COMPLETED | OUTPATIENT
Start: 2025-05-15 | End: 2025-05-15

## 2025-05-15 RX ADMIN — METHYLPREDNISOLONE ACETATE 80 MG: 80 INJECTION, SUSPENSION INTRA-ARTICULAR; INTRALESIONAL; INTRAMUSCULAR; SOFT TISSUE at 11:55

## 2025-05-15 RX ADMIN — IOHEXOL 1 ML: 300 INJECTION, SOLUTION INTRAVENOUS at 11:54

## 2025-05-15 NOTE — TELEPHONE ENCOUNTER
Airway  Urgency: elective    Airway not difficult    General Information and Staff    Patient location during procedure: OR  CRNA: Davin Kern CRNA    Indications and Patient Condition  Indications for airway management: airway protection    Preoxygenated: yes  Mask difficulty assessment: 1 - vent by mask    Final Airway Details  Final airway type: supraglottic airway      Successful airway: I-gel  Size 4    Number of attempts at approach: 1    Additional Comments  LMA placed without difficulty, ventilation with assist, equal breath sounds and symmetric chest rise and fall. No trauma.             Wait until we give GERMÁN time to work

## 2025-05-15 NOTE — INTERVAL H&P NOTE
Update: (This section must be completed if the H&P was completed greater than 24 hrs to procedure or admission)    H&P reviewed. After examining the patient, I find no changed to the H&P since it had been written.    Patient re-evaluated. Accept as history and physical.    Everardo Nguyen MD/May 15, 2025/11:46 AM

## 2025-05-15 NOTE — DISCHARGE INSTR - LAB
Epidural Steroid Injection   WHAT YOU NEED TO KNOW:   An epidural steroid injection (LEVI) is a procedure to inject steroid medicine into the epidural space. The epidural space is between your spinal cord and vertebrae. Steroids reduce inflammation and fluid buildup in your spine that may be causing pain. You may be given pain medicine along with the steroids.          ACTIVITY  Do not drive or operate machinery today.  No strenuous activity today - bending, lifting, etc.  You may resume normal activites starting tomorrow - start slowly and as tolerated.  You may shower today, but no tub baths or hot tubs.  You may have numbness for several hours from the local anesthetic. Please use caution and common sense, especially with weight-bearing activities.    CARE OF THE INJECTION SITE  If you have soreness or pain, apply ice to the area today (20 minutes on/20 minutes off).  Starting tomorrow, you may use warm, moist heat or ice if needed.  You may have an increase or change in your discomfort for 36-48 hours after your treatment.  Apply ice and continue with any pain medication you have been prescribed.  Notify the [facility] if you have any of the following: redness, drainage, swelling, headache, stiff neck or fever above 100°F.    SPECIAL INSTRUCTIONS  Our office will contact you in approximately 14 days for a progress report.    MEDICATIONS  Continue to take all routine medications.  Our office may have instructed you to hold some medications.    As no general anesthesia was used in today's procedure, you should not experience any side effects related to anesthesia.     If you are diabetic, the steroids used in today's injection may temporarily increase your blood sugar levels after the first few days after your injection. Please keep a close eye on your sugars and alert the doctor who manages your diabetes if your sugars are significantly high from your baseline or you are symptomatic.     If you have a  problem specifically related to your procedure, please call our office at [phone].  Problems not related to your procedure should be directed to your primary care physician.

## 2025-05-15 NOTE — TELEPHONE ENCOUNTER
Pt in for GERMÁN today    Asking to repeat 4/25 TPI. States it helped right mid back but still having left midback pain.    Ok to repeat TPI or ov?

## 2025-05-16 NOTE — TELEPHONE ENCOUNTER
Caller: sarah Vasquez    Doctor: Dr. Nguyen    Reason for call: severe discomfort with neck & back pain since procedure.  Pt is supposed to go back to work tomorrow    Please advise    Call back#: [phone]

## 2025-05-16 NOTE — TELEPHONE ENCOUNTER
Taliai    S/w pt and advised same. Pt states pain and pressure to neck since GERMÁN yesterdy.  Advised ice and tylenol/ibuprofen. Advised increased pain is normal 24-48 hours post procedure. Denies headache, fever or any other concerns.

## 2025-05-16 NOTE — TELEPHONE ENCOUNTER
Caller: Patient    Doctor: Dr. Nguyen    Reason for call: patient calling to speak with nurse in regard to his injection request    Call back#: [phone]

## 2025-05-22 ENCOUNTER — OFFICE VISIT (OUTPATIENT)
Dept: PHYSICAL THERAPY | Facility: CLINIC | Age: 40
End: 2025-05-22
Payer: COMMERCIAL

## 2025-05-22 DIAGNOSIS — M54.6 CHRONIC BILATERAL THORACIC BACK PAIN: ICD-10-CM

## 2025-05-22 DIAGNOSIS — G89.29 CHRONIC BILATERAL THORACIC BACK PAIN: ICD-10-CM

## 2025-05-22 DIAGNOSIS — M54.42 CHRONIC BILATERAL LOW BACK PAIN WITH LEFT-SIDED SCIATICA: Primary | ICD-10-CM

## 2025-05-22 DIAGNOSIS — G89.29 CHRONIC BILATERAL LOW BACK PAIN WITH LEFT-SIDED SCIATICA: Primary | ICD-10-CM

## 2025-05-22 PROCEDURE — 97110 THERAPEUTIC EXERCISES: CPT

## 2025-05-22 NOTE — PROGRESS NOTES
"Daily Note     Today's date: 2025  Patient name: Pedro Morse  : 1985  MRN: [id number]  Referring provider: Everardo Nguyen MD  Dx:   Encounter Diagnosis     ICD-10-CM    1. Chronic bilateral low back pain with left-sided sciatica  G89.29     M54.42       2. Chronic bilateral thoracic back pain  M54.6     G89.29           Start Time: 1210  Stop Time: 1245  Total time in clinic (min): 35 minutes    Subjective: Pt reports no significant change in his symptoms. He has stiffness in the L side of his lower back today.       Objective: See treatment diary below      Assessment: Experimented with multiple new exercises today which were all tolerated well. Pt had slight low back tightness with TTN but demonstrated full ROM. No pain reported with any exercise. Demonstrated compensations with side plank that he was able to correct with cues. C/c is fatigue today 2/2 deconditioning. Pt will continue to benefit from skilled PT to build strength of core and Ues.       Plan: Progress treatment as tolerated.       Precautions: Fracture rib 3/3  POC expires Unit limit Auth Expiration date PT/OT/ST + Visit Limit?   25 BOMN N/A BOMN                           Visit/Unit Tracking  AUTH Status:  Date  5/6   N/A Used 37  31 32 33 34 35 36    Remaining              HEP: [id number]   Manuals            S/L lumbopelvic mob  SG                                                  Neuro Re-Ed             Hallow holds             Planks             's walk                                                                 Ther Ex             HEP education 8'            Deadlift             Front squat             Goblet squat  15#  2x15           Assisted Pull ups  GMB  1x10  2x6           Bent over row             Open book  1x10  ea           TTN  x20           Prone swimmer  1x10           Chest supported incline row  15#  3x12           Side plank  3x30\"           Ther Activity         "                               Gait Training                                       Modalities

## 2025-05-29 ENCOUNTER — TELEPHONE (OUTPATIENT)
Dept: PAIN MEDICINE | Facility: CLINIC | Age: 40
End: 2025-05-29

## 2025-06-02 NOTE — TELEPHONE ENCOUNTER
Caller: Pedro  Doctor/office: Dr Nguyen  CB#: [phone]    % of improvement:50%  Pain Scale (1-10): 5/10    Patient is ready to schedule next procedure

## 2025-06-02 NOTE — TELEPHONE ENCOUNTER
Caller: Patient    Doctor: Dr. Nguyen    Reason for call: Returning  missed call    Call back#:

## 2025-06-09 ENCOUNTER — OFFICE VISIT (OUTPATIENT)
Dept: PHYSICAL THERAPY | Facility: CLINIC | Age: 40
End: 2025-06-09
Payer: COMMERCIAL

## 2025-06-09 DIAGNOSIS — G89.29 CHRONIC BILATERAL THORACIC BACK PAIN: ICD-10-CM

## 2025-06-09 DIAGNOSIS — G89.29 CHRONIC BILATERAL LOW BACK PAIN WITH LEFT-SIDED SCIATICA: Primary | ICD-10-CM

## 2025-06-09 DIAGNOSIS — M54.6 CHRONIC BILATERAL THORACIC BACK PAIN: ICD-10-CM

## 2025-06-09 DIAGNOSIS — M54.42 CHRONIC BILATERAL LOW BACK PAIN WITH LEFT-SIDED SCIATICA: Primary | ICD-10-CM

## 2025-06-09 PROCEDURE — 97110 THERAPEUTIC EXERCISES: CPT

## 2025-06-09 PROCEDURE — 97112 NEUROMUSCULAR REEDUCATION: CPT

## 2025-06-09 PROCEDURE — 97140 MANUAL THERAPY 1/> REGIONS: CPT

## 2025-06-09 NOTE — PROGRESS NOTES
"Daily Note     Today's date: 2025  Patient name: Pedro Morse  : 1985  MRN: [id number]  Referring provider: Everardo Nguyen MD  Dx:   Encounter Diagnosis     ICD-10-CM    1. Chronic bilateral low back pain with left-sided sciatica  G89.29     M54.42       2. Chronic bilateral thoracic back pain  M54.6     G89.29           Start Time: 1732  Stop Time: 1815  Total time in clinic (min): 43 minutes    Subjective: Pt reports significant increase in low back pain that is currently radiating into his Les. He notes he was moving boxes and woke up the next morning in pain.       Objective: See treatment diary below  5/10 pain at rest  4/10 following core stability    Assessment: Movement screen revealed pain with all L/S AROM. Palpation of spinal musculature unremarkable for spasm or trigger points. PA mobilization elicited discomfort in the lower lumbar and upper thoracic region. (+) Prone instability test. Utilized low intensity core stabilization exercises that resulted in slightly less pain with L/S AROM. Educated pt on performing new exercises at home and provided updated HEP. Pt will continue to benefit from skilled PT to build strength of core and Ues.       Plan: Progress treatment as tolerated.       Precautions: Fracture rib 3/3  POC expires Unit limit Auth Expiration date PT/OT/ST + Visit Limit?   25 BOMN N/A BOMN                           Visit/Unit Tracking  AUTH Status:  Date  5/6   N/A Used 37 38   33 34 35 36    Remaining              HEP: [id number]   Manuals           S/L lumbopelvic mob  SG           PA mobs   SG                                    Neuro Re-Ed             Hallow holds             Planks             's walk             Curl ups   1x10  ea          Modified side plank   1x10  Ea  5\"          Bird dog   x20          Pallof press   8#  2x10ea                       Ther Ex             Pt edu  SG - movement screen           HEP education " "8'            Deadlift             Front squat             Goblet squat  15#  2x15           Assisted Pull ups  GMB  1x10  2x6           Bent over row             Open book  1x10  ea           TTN  x20           Prone swimmer  1x10           Chest supported incline row  15#  3x12           Side plank  3x30\"           Ther Activity                                       Gait Training                                       Modalities                                            "

## 2025-06-10 DIAGNOSIS — E03.9 HYPOTHYROIDISM, UNSPECIFIED TYPE: ICD-10-CM

## 2025-06-11 RX ORDER — LEVOTHYROXINE SODIUM 100 UG/1
100 TABLET ORAL DAILY
Qty: 30 TABLET | Refills: 0 | OUTPATIENT
Start: 2025-06-11

## 2025-06-16 ENCOUNTER — OFFICE VISIT (OUTPATIENT)
Dept: PHYSICAL THERAPY | Facility: CLINIC | Age: 40
End: 2025-06-16
Payer: COMMERCIAL

## 2025-06-16 DIAGNOSIS — M54.6 CHRONIC BILATERAL THORACIC BACK PAIN: ICD-10-CM

## 2025-06-16 DIAGNOSIS — M54.42 CHRONIC BILATERAL LOW BACK PAIN WITH LEFT-SIDED SCIATICA: Primary | ICD-10-CM

## 2025-06-16 DIAGNOSIS — G89.29 CHRONIC BILATERAL THORACIC BACK PAIN: ICD-10-CM

## 2025-06-16 DIAGNOSIS — G89.29 CHRONIC BILATERAL LOW BACK PAIN WITH LEFT-SIDED SCIATICA: Primary | ICD-10-CM

## 2025-06-16 PROCEDURE — 97140 MANUAL THERAPY 1/> REGIONS: CPT

## 2025-06-16 PROCEDURE — 97112 NEUROMUSCULAR REEDUCATION: CPT

## 2025-06-16 PROCEDURE — 97110 THERAPEUTIC EXERCISES: CPT

## 2025-06-16 NOTE — PROGRESS NOTES
"Daily Note     Today's date: 2025  Patient name: Pedro Morse  : 1985  MRN: [id number]  Referring provider: Everardo Nguyen MD  Dx:   Encounter Diagnosis     ICD-10-CM    1. Chronic bilateral low back pain with left-sided sciatica  G89.29     M54.42       2. Chronic bilateral thoracic back pain  M54.6     G89.29                      Subjective: Pt reports significant increase in low back pain that is currently radiating into his Les. He notes he was moving boxes and woke up the next morning in pain.       Objective: See treatment diary below  5/10 pain at rest  4/10 following core stability    Assessment: Movement screen revealed pain with all L/S AROM. Palpation of spinal musculature unremarkable for spasm or trigger points. PA mobilization elicited discomfort in the lower lumbar and upper thoracic region. (+) Prone instability test. Utilized low intensity core stabilization exercises that resulted in slightly less pain with L/S AROM. Educated pt on performing new exercises at home and provided updated HEP. Pt will continue to benefit from skilled PT to build strength of core and Ues.       Plan: Progress treatment as tolerated.       Precautions: Fracture rib 3/3  POC expires Unit limit Auth Expiration date PT/OT/ST + Visit Limit?   25 BOMN N/A BOMN                           Visit/Unit Tracking  AUTH Status:  Date /   N/A Used 37 38 39  33 34 35 36    Remaining              HEP: [id number]   Manuals          S/L lumbopelvic mob  SG           PA mobs   SG          STM    MM                      Neuro Re-Ed             Hallow holds             Planks             's walk             Curl ups   1x10  ea          Modified side plank   1x10  Ea  5\" 7x10\" ea         Bird dog   x20 x20         Pallof press   8#  2x10ea 8# 2x10 ea                      Ther Ex             Pt edu  SG - movement screen           HEP education 8'            Deadlift       " "      Front squat             Goblet squat  15#  2x15           Assisted Pull ups  GMB  1x10  2x6           Bent over row             Open book  1x10  ea  2x10 ea         TTN  x20           Prone swimmer  1x10           Chest supported incline row  15#  3x12           Side plank  3x30\"           T/S Ext    20x5\"         Ther Activity                                       Gait Training                                       Modalities                                            "

## 2025-06-18 ENCOUNTER — RESULTS FOLLOW-UP (OUTPATIENT)
Dept: FAMILY MEDICINE CLINIC | Facility: CLINIC | Age: 40
End: 2025-06-18

## 2025-06-18 ENCOUNTER — APPOINTMENT (OUTPATIENT)
Dept: LAB | Facility: HOSPITAL | Age: 40
End: 2025-06-18
Attending: FAMILY MEDICINE
Payer: COMMERCIAL

## 2025-06-18 ENCOUNTER — OFFICE VISIT (OUTPATIENT)
Dept: FAMILY MEDICINE CLINIC | Facility: CLINIC | Age: 40
End: 2025-06-18
Payer: COMMERCIAL

## 2025-06-18 ENCOUNTER — TELEPHONE (OUTPATIENT)
Age: 40
End: 2025-06-18

## 2025-06-18 VITALS
OXYGEN SATURATION: 98 % | HEIGHT: 65 IN | HEART RATE: 98 BPM | BODY MASS INDEX: 28.79 KG/M2 | SYSTOLIC BLOOD PRESSURE: 122 MMHG | WEIGHT: 172.8 LBS | DIASTOLIC BLOOD PRESSURE: 78 MMHG | TEMPERATURE: 97.8 F

## 2025-06-18 DIAGNOSIS — E03.9 HYPOTHYROIDISM, UNSPECIFIED TYPE: Primary | ICD-10-CM

## 2025-06-18 DIAGNOSIS — Z13.220 ENCOUNTER FOR SCREENING FOR LIPID DISORDER: ICD-10-CM

## 2025-06-18 DIAGNOSIS — E03.9 HYPOTHYROIDISM, UNSPECIFIED TYPE: ICD-10-CM

## 2025-06-18 DIAGNOSIS — F41.8 SITUATIONAL ANXIETY: ICD-10-CM

## 2025-06-18 DIAGNOSIS — Z91.09 ENVIRONMENTAL ALLERGIES: ICD-10-CM

## 2025-06-18 LAB
ALT SERPL W P-5'-P-CCNC: 47 U/L (ref 7–52)
ANION GAP SERPL CALCULATED.3IONS-SCNC: 9 MMOL/L (ref 4–13)
AST SERPL W P-5'-P-CCNC: 53 U/L (ref 13–39)
BUN SERPL-MCNC: 15 MG/DL (ref 5–25)
CALCIUM SERPL-MCNC: 9.4 MG/DL (ref 8.4–10.2)
CHLORIDE SERPL-SCNC: 107 MMOL/L (ref 96–108)
CHOLEST SERPL-MCNC: 216 MG/DL (ref ?–200)
CO2 SERPL-SCNC: 27 MMOL/L (ref 21–32)
CREAT SERPL-MCNC: 0.81 MG/DL (ref 0.6–1.3)
GFR SERPL CREATININE-BSD FRML MDRD: 111 ML/MIN/1.73SQ M
GLUCOSE SERPL-MCNC: 80 MG/DL (ref 65–140)
HDLC SERPL-MCNC: 54 MG/DL
LDLC SERPL CALC-MCNC: 104 MG/DL (ref 0–100)
POTASSIUM SERPL-SCNC: 3.9 MMOL/L (ref 3.5–5.3)
SODIUM SERPL-SCNC: 143 MMOL/L (ref 135–147)
TRIGL SERPL-MCNC: 290 MG/DL (ref ?–150)
TSH SERPL DL<=0.05 MIU/L-ACNC: 1.18 UIU/ML (ref 0.45–4.5)

## 2025-06-18 PROCEDURE — 84443 ASSAY THYROID STIM HORMONE: CPT

## 2025-06-18 PROCEDURE — 84450 TRANSFERASE (AST) (SGOT): CPT

## 2025-06-18 PROCEDURE — 84460 ALANINE AMINO (ALT) (SGPT): CPT | Performed by: FAMILY MEDICINE

## 2025-06-18 PROCEDURE — 80048 BASIC METABOLIC PNL TOTAL CA: CPT

## 2025-06-18 PROCEDURE — 80061 LIPID PANEL: CPT

## 2025-06-18 PROCEDURE — 36415 COLL VENOUS BLD VENIPUNCTURE: CPT | Performed by: FAMILY MEDICINE

## 2025-06-18 PROCEDURE — 99214 OFFICE O/P EST MOD 30 MIN: CPT | Performed by: FAMILY MEDICINE

## 2025-06-18 RX ORDER — AZELASTINE 1 MG/ML
1 SPRAY, METERED NASAL 2 TIMES DAILY
Qty: 30 ML | Refills: 3 | Status: SHIPPED | OUTPATIENT
Start: 2025-06-18

## 2025-06-18 RX ORDER — BUSPIRONE HYDROCHLORIDE 7.5 MG/1
7.5 TABLET ORAL 2 TIMES DAILY
Qty: 60 TABLET | Refills: 5 | Status: SHIPPED | OUTPATIENT
Start: 2025-06-18

## 2025-06-18 RX ORDER — FLUTICASONE PROPIONATE 50 MCG
1 SPRAY, SUSPENSION (ML) NASAL DAILY
Qty: 11 ML | Refills: 3 | Status: SHIPPED | OUTPATIENT
Start: 2025-06-18

## 2025-06-18 RX ORDER — LEVOTHYROXINE SODIUM 100 UG/1
100 TABLET ORAL DAILY
Qty: 100 TABLET | Refills: 1 | Status: SHIPPED | OUTPATIENT
Start: 2025-06-18

## 2025-06-18 NOTE — TELEPHONE ENCOUNTER
Caller: Patient     Doctor: Dr. Nguyen     Reason for call: Patient needing to reschedule upcoming procedure.     Please assist     Call back#: [phone]

## 2025-06-18 NOTE — PROGRESS NOTES
"Name: Pedro Morse      : 1985      MRN: [id number]  Encounter Provider: EREN Rodriguez  Encounter Date: 2025   Encounter department: [facility] [address]  :  Assessment & Plan  Hypothyroidism, unspecified type  Stable per last TSH obtain updated values will continue current dosing pending  Orders:    TSH, 3rd generation; Future    Basic metabolic panel; Future    AST; Future    ALT    Situational anxiety  Discussed issues concerns, work, personal life, relocation  Discussed medications indications dosings warnings cautions  Orders:    busPIRone (BUSPAR) 7.5 mg tablet; Take 1 tablet (7.5 mg total) by mouth 2 (two) times a day    Environmental allergies  Discussed options for care, nasal antihistamine Astelin  Orders:    fluticasone (FLONASE) 50 mcg/act nasal spray; 1 spray into each nostril daily    azelastine (ASTELIN) 0.1 % nasal spray; 1 spray into each nostril 2 (two) times a day Use in each nostril as directed    Encounter for screening for lipid disorder    Orders:    Lipid Panel with Direct LDL reflex; Future           History of Present Illness   F/u  Thyroid , meds unchanged   Rel[place] [facility] , moving to [facility] , with elderly parents  A bit anxious over multiple issue     Medication Refill  This is a chronic problem. The current episode started more than 1 year ago. Pertinent negatives include no abdominal pain.     Review of Systems   Gastrointestinal:  Negative for abdominal pain.       Objective   /78 (BP Location: Left arm, Patient Position: Sitting)   Pulse 98   Temp 97.8 °F (36.6 °C)   Ht 5' 5\" (1.651 m)   Wt 78.4 kg (172 lb 12.8 oz)   SpO2 98%   BMI 28.76 kg/m²      Physical Exam  Constitutional:       General: He is not in acute distress.     Appearance: He is well-developed. He is not ill-appearing or toxic-appearing.   HENT:      Head: Normocephalic and atraumatic.      Right Ear: Tympanic membrane " normal.      Left Ear: Tympanic membrane normal.   Neck:      Vascular: No carotid bruit.     Cardiovascular:      Rate and Rhythm: Normal rate and regular rhythm.      Heart sounds: Normal heart sounds.   Pulmonary:      Effort: Pulmonary effort is normal.      Breath sounds: Normal breath sounds.   Abdominal:      General: Bowel sounds are normal.     Musculoskeletal:         General: Normal range of motion.      Cervical back: Normal range of motion and neck supple.   Lymphadenopathy:      Cervical: No cervical adenopathy.     Skin:     General: Skin is warm and dry.     Neurological:      Mental Status: He is alert and oriented to person, place, and time.      Deep Tendon Reflexes: Reflexes are normal and symmetric.     Psychiatric:         Attention and Perception: Attention and perception normal.         Mood and Affect: Mood and affect normal.         Speech: Speech normal.         Behavior: Behavior normal. Behavior is cooperative.         Thought Content: Thought content normal. Thought content does not include homicidal or suicidal ideation. Thought content does not include homicidal or suicidal plan.         Cognition and Memory: Cognition and memory normal.         Judgment: Judgment normal.

## 2025-06-19 ENCOUNTER — TELEPHONE (OUTPATIENT)
Age: 40
End: 2025-06-19

## 2025-06-19 NOTE — TELEPHONE ENCOUNTER
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   Pharmacy did not feel comfortable filling the script, we did speak to pharmacy to confirm. Please advise on alternative medication and send to [facility] once reviewed by provider. Thank you

## 2025-06-20 NOTE — TELEPHONE ENCOUNTER
Caller: sarah Vasquez     Doctor: Dr. Nguyen     Reason for call: returning schedulers call     Call back#: [phone]

## 2025-06-20 NOTE — TELEPHONE ENCOUNTER
Caller: Patient     Doctor: Dr. Nguyen     Reason for call: Patient returning call to reschedule procedure.     Please assist     Call back#: [phone]

## 2025-06-23 ENCOUNTER — OFFICE VISIT (OUTPATIENT)
Dept: PHYSICAL THERAPY | Facility: CLINIC | Age: 40
End: 2025-06-23
Payer: COMMERCIAL

## 2025-06-23 DIAGNOSIS — M54.42 CHRONIC BILATERAL LOW BACK PAIN WITH LEFT-SIDED SCIATICA: Primary | ICD-10-CM

## 2025-06-23 DIAGNOSIS — M54.6 CHRONIC BILATERAL THORACIC BACK PAIN: ICD-10-CM

## 2025-06-23 DIAGNOSIS — G89.29 CHRONIC BILATERAL THORACIC BACK PAIN: ICD-10-CM

## 2025-06-23 DIAGNOSIS — G89.29 CHRONIC BILATERAL LOW BACK PAIN WITH LEFT-SIDED SCIATICA: Primary | ICD-10-CM

## 2025-06-23 PROCEDURE — 97110 THERAPEUTIC EXERCISES: CPT | Performed by: PHYSICAL THERAPIST

## 2025-06-23 PROCEDURE — 97140 MANUAL THERAPY 1/> REGIONS: CPT | Performed by: PHYSICAL THERAPIST

## 2025-06-23 PROCEDURE — 97112 NEUROMUSCULAR REEDUCATION: CPT | Performed by: PHYSICAL THERAPIST

## 2025-06-23 NOTE — PROGRESS NOTES
"Daily Note     Today's date: 2025  Patient name: Pedro Morse  : 1985  MRN: [id number]  Referring provider: Everardo Nguyen MD  Dx:   Encounter Diagnosis     ICD-10-CM    1. Chronic bilateral low back pain with left-sided sciatica  G89.29     M54.42       2. Chronic bilateral thoracic back pain  M54.6     G89.29                      Subjective: Patient c/o pain in his C/S and left scapular area prior to treatment session.       Objective: See treatment diary below      Assessment: Patient performed additions as listed without significant c/o pain; positive response to mobilizations.       Plan: Progress treatment as tolerated.       Precautions: Fracture rib 3/3  POC expires Unit limit Auth Expiration date PT/OT/ST + Visit Limit?   25 BOMN N/A BOMN                           Visit/Unit Tracking  AUTH Status:  Date    N/A Used 37 38 39  33 34 35 36    Remaining              HEP: [id number]   Manuals         S/L lumbopelvic mob  SG           PA mobs   SG  KK gr 4/5        STM    MM                      Neuro Re-Ed             Hallow holds             Planks             's walk             Curl ups   1x10  ea          Modified side plank   1x10  Ea  5\" 7x10\" ea 4x15\"         Bird dog   x20 x20 x20        Pallof press   8#  2x10ea 8# 2x10 ea 10# 2x10 ea.                     Ther Ex             Pt edu  SG - movement screen           HEP education 8'            Deadlift             Front squat             Goblet squat  15#  2x15           Assisted Pull ups  GMB  1x10  2x6           KB suitcase deadlift     25# 2x10 ea.        Morton rows     18# 3x10        Open book  1x10  ea  2x10 ea         TTN  x20           Prone swimmer  1x10           Chest supported incline row  15#  3x12           Side plank  3x30\"           T/S Ext    20x5\"  Sitting + supine full foam 10x ea.        Ther Activity                                       Gait Training      "                                  Modalities

## 2025-06-23 NOTE — TELEPHONE ENCOUNTER
Caller: sarah Vasquez     Doctor: Dr. Nguyen     Reason for call: requesting to speak to      Call back#: [phone]

## 2025-06-30 ENCOUNTER — APPOINTMENT (OUTPATIENT)
Dept: PHYSICAL THERAPY | Facility: CLINIC | Age: 40
End: 2025-06-30
Payer: COMMERCIAL

## 2025-07-12 DIAGNOSIS — F41.8 SITUATIONAL ANXIETY: ICD-10-CM

## 2025-07-15 RX ORDER — BUSPIRONE HYDROCHLORIDE 7.5 MG/1
7.5 TABLET ORAL 2 TIMES DAILY
Qty: 180 TABLET | Refills: 2 | Status: SHIPPED | OUTPATIENT
Start: 2025-07-15

## 2025-07-25 ENCOUNTER — PROCEDURE VISIT (OUTPATIENT)
Dept: PAIN MEDICINE | Facility: CLINIC | Age: 40
End: 2025-07-25
Payer: COMMERCIAL

## 2025-07-25 VITALS — HEIGHT: 65 IN | BODY MASS INDEX: 28.66 KG/M2 | WEIGHT: 172 LBS

## 2025-07-25 DIAGNOSIS — M79.18 MYOFASCIAL PAIN SYNDROME: Primary | ICD-10-CM

## 2025-07-25 PROCEDURE — 76942 ECHO GUIDE FOR BIOPSY: CPT | Performed by: STUDENT IN AN ORGANIZED HEALTH CARE EDUCATION/TRAINING PROGRAM

## 2025-07-25 PROCEDURE — 20553 NJX 1/MLT TRIGGER POINTS 3/>: CPT | Performed by: STUDENT IN AN ORGANIZED HEALTH CARE EDUCATION/TRAINING PROGRAM

## 2025-07-25 RX ORDER — BUPIVACAINE HYDROCHLORIDE 2.5 MG/ML
9 INJECTION, SOLUTION EPIDURAL; INFILTRATION; INTRACAUDAL; PERINEURAL ONCE
Status: COMPLETED | OUTPATIENT
Start: 2025-07-25 | End: 2025-07-25

## 2025-07-25 RX ORDER — METHYLPREDNISOLONE ACETATE 40 MG/ML
40 INJECTION, SUSPENSION INTRA-ARTICULAR; INTRALESIONAL; INTRAMUSCULAR; SOFT TISSUE ONCE
Status: COMPLETED | OUTPATIENT
Start: 2025-07-25 | End: 2025-07-25

## 2025-07-25 RX ADMIN — BUPIVACAINE HYDROCHLORIDE 9 ML: 2.5 INJECTION, SOLUTION EPIDURAL; INFILTRATION; INTRACAUDAL; PERINEURAL at 12:30

## 2025-07-25 RX ADMIN — METHYLPREDNISOLONE ACETATE 40 MG: 40 INJECTION, SUSPENSION INTRA-ARTICULAR; INTRALESIONAL; INTRAMUSCULAR; SOFT TISSUE at 12:31

## 2025-07-25 NOTE — PROGRESS NOTES
"   Universal Protocol:  procedure performed by consultantConsent: Verbal consent obtained. Written consent obtained  Risks and benefits: risks, benefits and alternatives were discussed  Consent given by: patient  Time out: Immediately prior to procedure a \"time out\" was called to verify the correct patient, procedure, equipment, support staff and site/side marked as required.  Timeout called at: 7/25/2025 12:03 PM.  Patient understanding: patient states understanding of the procedure being performed  Patient consent: the patient's understanding of the procedure matches consent given  Procedure consent: procedure consent matches procedure scheduled  Relevant documents: relevant documents present and verified  Test results: test results available and properly labeled  Site marked: the operative site was marked  Radiology Images displayed and confirmed. If images not available, report reviewed: imaging studies available  Required items: required blood products, implants, devices, and special equipment available  Patient identity confirmed: verbally with patient  Supporting Documentation  Indications: pain    Injection site identified by: ultrasound  Procedure Details  Location(s):  Additional procedure details: PROCEDURE NOTE    PATIENT NAME:  Pedro Morse    MEDICAL RECORD NUMBER:  [id number]    YOB: 1985    DATE OF PROCEDURE:  07/25/25    PROCEDURE:  Trigger point injection x 8 with local anesthetic and steroid in the bilateral trapezius, bilateral rhomboid, bilateral thoracolumbar muscle groups under ultrasound guidance.   ATTENDING PHYSICIAN:  Everardo Nguyen M.D.  PREPROCEDURE DIAGNOSIS:  Myofascial pain with identifiable trigger points.  POSTPROCEDURE DIAGNOSIS:  Myofascial pain with identifiable trigger points.  ANESTHESIA:  Local  ESTIMATED BLOOD LOSS:  Minimal  COMPLICATIONS: None  CONSENT:  Today's procedure, its potential benefits as well as its risks and potential side effects were " reviewed.  Discussed risks of the procedure include bleeding, infection, nerve irritation or damage, reactions to the medications, failure of the pain to improve and exacerbation of the pain were explained to the patient, who verbalized understanding and who wished to proceed.  Informed consent was signed.  DESCRIPTION OF THE PROCEDURE:  After informed consent was obtained, the patient was placed in the seated position. 8 trigger points were identified via palpation and marked with a surgical skin marker.  The skin was prepped with antiseptic in the usual sterile fashion.  Strict aseptic technique was utilized throughout the procedure.  Using ultrasound guidance a 25 gauge, 2inch needle was then advanced into each identified trigger point.  Care was taken to visualize the entirety of the needle throughout the injection. An injectate consisting of 9 ml of 0.25% marcaine with 1 mL of 40mg/mL depo-medrol was slowly injected in divided doses after negative aspiration.  The needle was removed with tip intact.  The patient tolerated the procedure and hemostasis was maintained.  There were no apparent paresthesias or complications.  The skin was wiped clean, and a band-aid was placed as appropriate.  The patient was monitored for an appropriate period of time following the procedure and remained hemodynamically stable and neurovascularly intact.  The patient was ultimately discharged to home with supervision in good condition and instructed to call the office to report the response.

## 2025-08-16 DIAGNOSIS — E03.9 HYPOTHYROIDISM, UNSPECIFIED TYPE: ICD-10-CM

## 2025-08-18 RX ORDER — LEVOTHYROXINE SODIUM 100 MCG
100 TABLET ORAL DAILY
Qty: 90 TABLET | Refills: 3 | Status: SHIPPED | OUTPATIENT
Start: 2025-08-18